# Patient Record
Sex: FEMALE | Race: WHITE | Employment: FULL TIME | URBAN - METROPOLITAN AREA
[De-identification: names, ages, dates, MRNs, and addresses within clinical notes are randomized per-mention and may not be internally consistent; named-entity substitution may affect disease eponyms.]

---

## 2017-03-27 LAB — DIABETIC RETINOPATHY: NEGATIVE

## 2019-03-26 ENCOUNTER — HOSPITAL ENCOUNTER (OUTPATIENT)
Age: 59
Discharge: HOME | End: 2019-03-28

## 2019-03-26 PROCEDURE — 88305 TISSUE EXAM BY PATHOLOGIST: CPT

## 2019-11-05 LAB
AVERAGE GLUCOSE: NORMAL
CHOLESTEROL, TOTAL: NORMAL
CHOLESTEROL/HDL RATIO: NORMAL
HBA1C MFR BLD: NORMAL %
HDLC SERPL-MCNC: NORMAL MG/DL
LDL CHOLESTEROL CALCULATED: NORMAL
TRIGL SERPL-MCNC: NORMAL MG/DL
VLDLC SERPL CALC-MCNC: NORMAL MG/DL

## 2020-05-21 ENCOUNTER — HOSPITAL ENCOUNTER (EMERGENCY)
Age: 60
Discharge: HOME | End: 2020-05-21
Attending: EMERGENCY MEDICINE
Payer: COMMERCIAL

## 2020-05-21 ENCOUNTER — APPOINTMENT (OUTPATIENT)
Dept: GENERAL RADIOLOGY | Age: 60
End: 2020-05-21
Payer: COMMERCIAL

## 2020-05-21 VITALS
OXYGEN SATURATION: 100 % | SYSTOLIC BLOOD PRESSURE: 152 MMHG | DIASTOLIC BLOOD PRESSURE: 67 MMHG | HEART RATE: 78 BPM | RESPIRATION RATE: 18 BRPM | TEMPERATURE: 98.2 F

## 2020-05-21 LAB
ALBUMIN SERPL-MCNC: 4.3 G/DL (ref 3.5–5.2)
ALP BLD-CCNC: 66 U/L (ref 35–104)
ALT SERPL-CCNC: 16 U/L (ref 0–32)
ANION GAP SERPL CALCULATED.3IONS-SCNC: 17 MMOL/L (ref 7–16)
AST SERPL-CCNC: 20 U/L (ref 0–31)
BASOPHILS ABSOLUTE: 0.07 E9/L (ref 0–0.2)
BASOPHILS RELATIVE PERCENT: 0.3 % (ref 0–2)
BETA-HYDROXYBUTYRATE: 0.61 MMOL/L (ref 0.02–0.27)
BILIRUB SERPL-MCNC: 1.5 MG/DL (ref 0–1.2)
BILIRUBIN URINE: NEGATIVE
BLOOD, URINE: NEGATIVE
BUN BLDV-MCNC: 20 MG/DL (ref 8–23)
CALCIUM SERPL-MCNC: 9.6 MG/DL (ref 8.6–10.2)
CHLORIDE BLD-SCNC: 99 MMOL/L (ref 98–107)
CHP ED QC CHECK: NORMAL
CLARITY: CLEAR
CO2: 22 MMOL/L (ref 22–29)
COLOR: ABNORMAL
CREAT SERPL-MCNC: 0.8 MG/DL (ref 0.5–1)
EOSINOPHILS ABSOLUTE: 0.01 E9/L (ref 0.05–0.5)
EOSINOPHILS RELATIVE PERCENT: 0 % (ref 0–6)
GFR AFRICAN AMERICAN: >60
GFR NON-AFRICAN AMERICAN: >60 ML/MIN/1.73
GLUCOSE BLD-MCNC: 234 MG/DL
GLUCOSE BLD-MCNC: 351 MG/DL (ref 74–99)
GLUCOSE URINE: >=1000 MG/DL
HCT VFR BLD CALC: 42.8 % (ref 34–48)
HEMOGLOBIN: 14.5 G/DL (ref 11.5–15.5)
IMMATURE GRANULOCYTES #: 0.14 E9/L
IMMATURE GRANULOCYTES %: 0.6 % (ref 0–5)
KETONES, URINE: 15 MG/DL
LEUKOCYTE ESTERASE, URINE: NEGATIVE
LYMPHOCYTES ABSOLUTE: 0.96 E9/L (ref 1.5–4)
LYMPHOCYTES RELATIVE PERCENT: 4.4 % (ref 20–42)
MCH RBC QN AUTO: 31.3 PG (ref 26–35)
MCHC RBC AUTO-ENTMCNC: 33.9 % (ref 32–34.5)
MCV RBC AUTO: 92.4 FL (ref 80–99.9)
METER GLUCOSE: 234 MG/DL (ref 74–99)
MONOCYTES ABSOLUTE: 1.45 E9/L (ref 0.1–0.95)
MONOCYTES RELATIVE PERCENT: 6.6 % (ref 2–12)
NEUTROPHILS ABSOLUTE: 19.36 E9/L (ref 1.8–7.3)
NEUTROPHILS RELATIVE PERCENT: 88.1 % (ref 43–80)
NITRITE, URINE: NEGATIVE
PDW BLD-RTO: 12.6 FL (ref 11.5–15)
PH UA: 6.5 (ref 5–9)
PLATELET # BLD: 175 E9/L (ref 130–450)
PMV BLD AUTO: 12.2 FL (ref 7–12)
POTASSIUM SERPL-SCNC: 4.3 MMOL/L (ref 3.5–5)
PROTEIN UA: NEGATIVE MG/DL
RBC # BLD: 4.63 E12/L (ref 3.5–5.5)
SODIUM BLD-SCNC: 138 MMOL/L (ref 132–146)
SPECIFIC GRAVITY UA: 1.01 (ref 1–1.03)
TOTAL PROTEIN: 7.4 G/DL (ref 6.4–8.3)
TROPONIN: <0.01 NG/ML (ref 0–0.03)
UROBILINOGEN, URINE: 0.2 E.U./DL
WBC # BLD: 22 E9/L (ref 4.5–11.5)

## 2020-05-21 PROCEDURE — 80053 COMPREHEN METABOLIC PANEL: CPT

## 2020-05-21 PROCEDURE — 82010 KETONE BODYS QUAN: CPT

## 2020-05-21 PROCEDURE — 93005 ELECTROCARDIOGRAM TRACING: CPT | Performed by: EMERGENCY MEDICINE

## 2020-05-21 PROCEDURE — 82962 GLUCOSE BLOOD TEST: CPT

## 2020-05-21 PROCEDURE — 81003 URINALYSIS AUTO W/O SCOPE: CPT

## 2020-05-21 PROCEDURE — 71045 X-RAY EXAM CHEST 1 VIEW: CPT

## 2020-05-21 PROCEDURE — 99285 EMERGENCY DEPT VISIT HI MDM: CPT

## 2020-05-21 PROCEDURE — 2580000003 HC RX 258: Performed by: EMERGENCY MEDICINE

## 2020-05-21 PROCEDURE — 85025 COMPLETE CBC W/AUTO DIFF WBC: CPT

## 2020-05-21 PROCEDURE — 84484 ASSAY OF TROPONIN QUANT: CPT

## 2020-05-21 RX ORDER — 0.9 % SODIUM CHLORIDE 0.9 %
1000 INTRAVENOUS SOLUTION INTRAVENOUS ONCE
Status: COMPLETED | OUTPATIENT
Start: 2020-05-21 | End: 2020-05-21

## 2020-05-21 RX ORDER — SODIUM CHLORIDE 9 MG/ML
INJECTION, SOLUTION INTRAVENOUS CONTINUOUS
Status: DISCONTINUED | OUTPATIENT
Start: 2020-05-21 | End: 2020-05-21

## 2020-05-21 RX ADMIN — SODIUM CHLORIDE 1000 ML: 9 INJECTION, SOLUTION INTRAVENOUS at 13:47

## 2020-05-21 NOTE — ED PROVIDER NOTES
Negative   Troponin   Result Value Ref Range    Troponin <0.01 0.00 - 0.03 ng/mL   POCT Glucose   Result Value Ref Range    Glucose 234 mg/dL    QC OK? y    ,       RADIOLOGY:  Interpreted by Radiologist unless otherwise specified  XR CHEST PORTABLE   Final Result   No acute process                     EKG Interpretation  Interpreted by emergency department physician, Dr. Gonzalez Granger, rate 62, no STEMI      ------------------------- NURSING NOTES AND VITALS REVIEWED ---------------------------   The nursing notes within the ED encounter and vital signs as below have been reviewed by myself  BP (!) 174/54   Pulse 74   Temp 98.2 °F (36.8 °C) (Tympanic)   Resp 20   SpO2 99%     Oxygen Saturation Interpretation: Normal    The patients available past medical records and past encounters were reviewed. ------------------------------ ED COURSE/MEDICAL DECISION MAKING----------------------  Medications   0.9 % sodium chloride infusion (has no administration in time range)   0.9 % sodium chloride bolus (0 mLs Intravenous Stopped 5/21/20 1437)           The cardiac monitor revealed sinus with a heart rate in the 70s as interpreted by me. The cardiac monitor was ordered secondary to the patient's hyperglycemia and to monitor the patient for dysrhythmia. CPT [id number]         Medical Decision Making:    Patient had no symptoms on arrival.  Did receive IV fluids. Labs and imaging reviewed. Reevaluation, patient's resting, once again no symptoms or complaints. She has no signs of DKA, repeat sugar is improving. She would like to go home. Therefore, patient will be discharged, she is to follow-up with her PCP in 1 to 2 days. She is educated on signs symptoms require emergent evaluation. Counseling: The emergency provider has spoken with the patient and discussed todays results, in addition to providing specific details for the plan of care and counseling regarding the diagnosis and prognosis.   Questions are answered at this time and they are agreeable with the plan.       --------------------------------- IMPRESSION AND DISPOSITION ---------------------------------    IMPRESSION  1. Hyperglycemia        DISPOSITION  Disposition: Discharge to home  Patient condition is stable        NOTE: This report was transcribed using voice recognition software.  Every effort was made to ensure accuracy; however, inadvertent computerized transcription errors may be present       Livan Prather MD  05/21/20 1576

## 2020-05-22 LAB
EKG ATRIAL RATE: 62 BPM
EKG P AXIS: 74 DEGREES
EKG P-R INTERVAL: 148 MS
EKG Q-T INTERVAL: 432 MS
EKG QRS DURATION: 84 MS
EKG QTC CALCULATION (BAZETT): 438 MS
EKG R AXIS: 74 DEGREES
EKG T AXIS: 74 DEGREES
EKG VENTRICULAR RATE: 62 BPM

## 2020-05-22 PROCEDURE — 93010 ELECTROCARDIOGRAM REPORT: CPT | Performed by: INTERNAL MEDICINE

## 2020-06-17 RX ORDER — ERGOCALCIFEROL 1.25 MG/1
50000 CAPSULE ORAL WEEKLY
Qty: 4 CAPSULE | Refills: 0 | Status: SHIPPED
Start: 2020-06-17 | End: 2020-08-03 | Stop reason: SDUPTHER

## 2020-07-01 VITALS
HEART RATE: 83 BPM | HEIGHT: 68 IN | TEMPERATURE: 98.5 F | BODY MASS INDEX: 25.16 KG/M2 | WEIGHT: 166 LBS | DIASTOLIC BLOOD PRESSURE: 75 MMHG | SYSTOLIC BLOOD PRESSURE: 137 MMHG

## 2020-07-01 RX ORDER — ATORVASTATIN CALCIUM 10 MG/1
10 TABLET, FILM COATED ORAL DAILY
COMMUNITY
End: 2020-10-12 | Stop reason: SDUPTHER

## 2020-07-15 ENCOUNTER — OFFICE VISIT (OUTPATIENT)
Dept: PRIMARY CARE CLINIC | Age: 60
End: 2020-07-15
Payer: COMMERCIAL

## 2020-07-15 VITALS
WEIGHT: 173 LBS | BODY MASS INDEX: 25.62 KG/M2 | HEART RATE: 88 BPM | TEMPERATURE: 97.2 F | DIASTOLIC BLOOD PRESSURE: 90 MMHG | SYSTOLIC BLOOD PRESSURE: 150 MMHG | RESPIRATION RATE: 18 BRPM | HEIGHT: 69 IN | OXYGEN SATURATION: 97 %

## 2020-07-15 PROCEDURE — 99214 OFFICE O/P EST MOD 30 MIN: CPT | Performed by: INTERNAL MEDICINE

## 2020-07-15 ASSESSMENT — PATIENT HEALTH QUESTIONNAIRE - PHQ9
2. FEELING DOWN, DEPRESSED OR HOPELESS: 0
SUM OF ALL RESPONSES TO PHQ QUESTIONS 1-9: 0
SUM OF ALL RESPONSES TO PHQ QUESTIONS 1-9: 0
SUM OF ALL RESPONSES TO PHQ9 QUESTIONS 1 & 2: 0
1. LITTLE INTEREST OR PLEASURE IN DOING THINGS: 0

## 2020-07-15 NOTE — PROGRESS NOTES
Michele Torres  7/15/20     Chief Complaint   Patient presents with    Diabetes     4 month check up        Allergies   Allergen Reactions    Pcn [Penicillins]     Sulfa Antibiotics         Current Outpatient Medications   Medication Sig Dispense Refill    atorvastatin (LIPITOR) 10 MG tablet Take 10 mg by mouth daily      vitamin D (ERGOCALCIFEROL) 1.25 MG (26680 UT) CAPS capsule Take 1 capsule by mouth once a week 4 capsule 0    levothyroxine (SYNTHROID) 25 MCG tablet TAKE ONE TABLET BY MOUTH EVERY DAY  90 tablet 1    COMBIPATCH 0.05-0.14 MG/DAY Place 1 patch onto the skin twice a week. 8 patch 2    lisinopril (PRINIVIL;ZESTRIL) 2.5 MG tablet Take 1 tablet by mouth daily. 90 tablet 0    insulin lispro (HUMALOG) 100 UNIT/ML injection Inject 3 Units into the skin continuous. Pt uses 140 units every 3 - 4 days in insulin pump  Indications: .8 units an hour pt adjusts as she eats 6 vial 1    glucose blood VI test strips (ASCENSIA AUTODISC VI;ONE TOUCH ULTRA TEST VI) strip by In Vitro route as needed. Pt tests 3 times daily      LANCETS by Does not apply route. Pt tests 3 times daily      INSULIN INFUSION PUMP by Does not apply route. HUMALOG INSULIN      Ascorbic Acid (VITAMIN C) 500 MG tablet Take 500 mg by mouth daily.  Cyanocobalamin (VITAMIN B-12 CR PO) Take 1 tablet by mouth daily.  VITAMIN E 400 mg by Does not apply route daily. No current facility-administered medications for this visit. HPI: Patient comes in for routine follow-up visit. She has been doing fairly well except for an episode of hyperglycemia and severe agitation which she attributes to taking co-Q10. Since she stopped taking that she has not had any further episodes. She has not had any recent episodes of hypoglycemia. She remains physically active but does not exercise on a regular basis. She denies any chest pain or shortness of breath.   She remains on all her usual meds and insulin the same as listed on her med list, which was reviewed with her. She follows up with her gynecologist for her annual Pap test and mammograms. She had a colonoscopy within the past year and apparently she had a small polyp removed. When she was in the ER about 2 months ago her white blood count was elevated 22,000. It was not clear what the cause of that was at the time. Review of Systems: as per HPI      Physical Exam:    Patient is a 63 y.o. female. Patient appears to be in no distress. Breathing comfortably. Ambulates without assistance. HEENT: normal.  Neck supple, no adenopathy or bruits. Heart RR, no MGR. Lungs clear. Abd: normal  Ext.: no edema. Peripheral pulses: normal.  No neurologic deficits noted. Lab Results   Component Value Date    WBC 22.0 (H) 05/21/2020    HGB 14.5 05/21/2020    HCT 42.8 05/21/2020     05/21/2020    CHOL 204 (H) 08/14/2015    TRIG 60 08/14/2015    HDL 83 08/14/2015    ALT 16 05/21/2020    AST 20 05/21/2020    TSH 3.530 08/14/2015    INR 1.2 12/28/2011    LABA1C 8.6 (H) 08/14/2015      Lab Results   Component Value Date     05/21/2020    K 4.3 05/21/2020    CL 99 05/21/2020    CO2 22 05/21/2020    BUN 20 05/21/2020    CREATININE 0.8 05/21/2020    GLUCOSE 234 05/21/2020    CALCIUM 9.6 05/21/2020    PROT 7.4 05/21/2020    LABALBU 4.3 05/21/2020    BILITOT 1.5 (H) 05/21/2020    ALKPHOS 66 05/21/2020    AST 20 05/21/2020    ALT 16 05/21/2020    LABGLOM >60 05/21/2020    GFRAA >60 05/21/2020            Assessment:      Type 1 diabetes mellitus without complication ([facility] [address]  -     Comprehensive Metabolic Panel; Future  -     Hemoglobin A1C; Future    Mixed hyperlipidemia  -     Lipid Panel; Future    Acquired hypothyroidism  -     Cancel: CBC; Future  -     TSH without Reflex; Future    Bandemia when checked in the ER 2 months ago when she had an episode of hyperglycemia. Significance uncertain.   -     CBC Auto Differential; Future          Discussion Notes: Patient to continue all of her usual meds and supplements the same as listed on her med list.  We will get blood work including a CMP, hemoglobin A1c, lipid panel, TSH, and CBC with differential.  Will make further recommendations depending on the results.   Otherwise she will return in 6 months for her annual physical.

## 2020-07-16 ENCOUNTER — TELEPHONE (OUTPATIENT)
Dept: PRIMARY CARE CLINIC | Age: 60
End: 2020-07-16

## 2020-07-16 NOTE — TELEPHONE ENCOUNTER
Pt was seen yesterday and went for labs this am but lab could not draw her due to the labs were ordered for expected date of 7/15/2021.  She is asking for those orders to be corrected with expected date of today so she can go back to lab tomorrow

## 2020-07-17 ENCOUNTER — HOSPITAL ENCOUNTER (OUTPATIENT)
Age: 60
Discharge: HOME | End: 2020-07-19
Payer: COMMERCIAL

## 2020-07-17 LAB
ALBUMIN SERPL-MCNC: 4.5 G/DL (ref 3.5–5.2)
ALP BLD-CCNC: 71 U/L (ref 35–104)
ALT SERPL-CCNC: 16 U/L (ref 0–32)
ANION GAP SERPL CALCULATED.3IONS-SCNC: 13 MMOL/L (ref 7–16)
AST SERPL-CCNC: 18 U/L (ref 0–31)
BILIRUB SERPL-MCNC: 0.9 MG/DL (ref 0–1.2)
BUN BLDV-MCNC: 18 MG/DL (ref 8–23)
CALCIUM SERPL-MCNC: 9.9 MG/DL (ref 8.6–10.2)
CHLORIDE BLD-SCNC: 101 MMOL/L (ref 98–107)
CHOLESTEROL, TOTAL: 177 MG/DL (ref 0–199)
CO2: 26 MMOL/L (ref 22–29)
CREAT SERPL-MCNC: 0.7 MG/DL (ref 0.5–1)
GFR AFRICAN AMERICAN: >60
GFR NON-AFRICAN AMERICAN: >60 ML/MIN/1.73
GLUCOSE BLD-MCNC: 145 MG/DL (ref 74–99)
HBA1C MFR BLD: 9 % (ref 4–5.6)
HCT VFR BLD CALC: 46.7 % (ref 34–48)
HDLC SERPL-MCNC: 86 MG/DL
HEMOGLOBIN: 15.6 G/DL (ref 11.5–15.5)
LDL CHOLESTEROL CALCULATED: 82 MG/DL (ref 0–99)
MCH RBC QN AUTO: 31.5 PG (ref 26–35)
MCHC RBC AUTO-ENTMCNC: 33.4 % (ref 32–34.5)
MCV RBC AUTO: 94.2 FL (ref 80–99.9)
PDW BLD-RTO: 12.8 FL (ref 11.5–15)
PLATELET # BLD: 186 E9/L (ref 130–450)
PMV BLD AUTO: 12.8 FL (ref 7–12)
POTASSIUM SERPL-SCNC: 4.8 MMOL/L (ref 3.5–5)
RBC # BLD: 4.96 E12/L (ref 3.5–5.5)
SODIUM BLD-SCNC: 140 MMOL/L (ref 132–146)
TOTAL PROTEIN: 7.3 G/DL (ref 6.4–8.3)
TRIGL SERPL-MCNC: 47 MG/DL (ref 0–149)
TSH SERPL DL<=0.05 MIU/L-ACNC: 2.45 UIU/ML (ref 0.27–4.2)
VLDLC SERPL CALC-MCNC: 9 MG/DL
WBC # BLD: 8.2 E9/L (ref 4.5–11.5)

## 2020-07-17 PROCEDURE — 85027 COMPLETE CBC AUTOMATED: CPT

## 2020-07-17 PROCEDURE — 36415 COLL VENOUS BLD VENIPUNCTURE: CPT

## 2020-07-17 PROCEDURE — 84443 ASSAY THYROID STIM HORMONE: CPT

## 2020-07-17 PROCEDURE — 80061 LIPID PANEL: CPT

## 2020-07-17 PROCEDURE — 83036 HEMOGLOBIN GLYCOSYLATED A1C: CPT

## 2020-07-17 PROCEDURE — 80053 COMPREHEN METABOLIC PANEL: CPT

## 2020-08-03 RX ORDER — ERGOCALCIFEROL 1.25 MG/1
50000 CAPSULE ORAL WEEKLY
Qty: 12 CAPSULE | Refills: 3 | Status: SHIPPED
Start: 2020-08-03 | End: 2021-07-06 | Stop reason: SDUPTHER

## 2020-10-12 RX ORDER — ATORVASTATIN CALCIUM 10 MG/1
10 TABLET, FILM COATED ORAL DAILY
Qty: 12 TABLET | Refills: 3 | Status: SHIPPED
Start: 2020-10-12 | End: 2021-02-02 | Stop reason: SDUPTHER

## 2020-11-12 RX ORDER — LISINOPRIL 20 MG/1
20 TABLET ORAL DAILY
COMMUNITY
End: 2020-11-12 | Stop reason: SDUPTHER

## 2020-11-12 RX ORDER — LISINOPRIL 20 MG/1
20 TABLET ORAL DAILY
Qty: 90 TABLET | Refills: 3 | Status: SHIPPED
Start: 2020-11-12 | End: 2021-12-13 | Stop reason: SDUPTHER

## 2021-02-02 RX ORDER — ATORVASTATIN CALCIUM 10 MG/1
TABLET, FILM COATED ORAL
Qty: 12 TABLET | Refills: 3 | Status: SHIPPED
Start: 2021-02-02 | End: 2021-06-08 | Stop reason: SDUPTHER

## 2021-02-15 ENCOUNTER — OFFICE VISIT (OUTPATIENT)
Dept: PRIMARY CARE CLINIC | Age: 61
End: 2021-02-15
Payer: COMMERCIAL

## 2021-02-15 VITALS
RESPIRATION RATE: 16 BRPM | WEIGHT: 171 LBS | HEART RATE: 91 BPM | OXYGEN SATURATION: 99 % | TEMPERATURE: 97 F | HEIGHT: 69 IN | SYSTOLIC BLOOD PRESSURE: 150 MMHG | BODY MASS INDEX: 25.33 KG/M2 | DIASTOLIC BLOOD PRESSURE: 82 MMHG

## 2021-02-15 DIAGNOSIS — I10 ESSENTIAL HYPERTENSION: Primary | ICD-10-CM

## 2021-02-15 DIAGNOSIS — E10.9 TYPE 1 DIABETES MELLITUS WITHOUT COMPLICATION (HCC): Chronic | ICD-10-CM

## 2021-02-15 PROCEDURE — 99213 OFFICE O/P EST LOW 20 MIN: CPT | Performed by: INTERNAL MEDICINE

## 2021-02-15 RX ORDER — AMLODIPINE BESYLATE 2.5 MG/1
2.5 TABLET ORAL DAILY
Qty: 30 TABLET | Refills: 1 | Status: SHIPPED
Start: 2021-02-15 | End: 2021-04-12 | Stop reason: SDUPTHER

## 2021-02-15 ASSESSMENT — PATIENT HEALTH QUESTIONNAIRE - PHQ9
SUM OF ALL RESPONSES TO PHQ QUESTIONS 1-9: 0
1. LITTLE INTEREST OR PLEASURE IN DOING THINGS: 0
SUM OF ALL RESPONSES TO PHQ QUESTIONS 1-9: 0

## 2021-02-15 NOTE — PROGRESS NOTES
Michele Torres  2/15/21     Chief Complaint   Patient presents with    Hypertension        Allergies   Allergen Reactions    Pcn [Penicillins]     Sulfa Antibiotics         Current Outpatient Medications   Medication Sig Dispense Refill    atorvastatin (LIPITOR) 10 MG tablet Take one tab Monday,wenesday,friday 12 tablet 3    blood glucose test strips (ASCENSIA AUTODISC VI;ONE TOUCH ULTRA TEST VI) strip Contour next test strips check qid 400 each 3    insulin lispro (HUMALOG) 100 UNIT/ML injection vial Inject 3 Units into the skin continuous Indications: .8 units an hour pt adjusts as she eats Pt uses 140 units every 3 - 4 days in insulin pump 6 vial 1    lisinopril (PRINIVIL;ZESTRIL) 20 MG tablet Take 1 tablet by mouth daily 90 tablet 3    vitamin D (ERGOCALCIFEROL) 1.25 MG (76701 UT) CAPS capsule Take 1 capsule by mouth once a week 12 capsule 3    levothyroxine (SYNTHROID) 25 MCG tablet TAKE ONE TABLET BY MOUTH EVERY DAY  90 tablet 1    COMBIPATCH 0.05-0.14 MG/DAY Place 1 patch onto the skin twice a week. 8 patch 2    LANCETS by Does not apply route. Pt tests 3 times daily      INSULIN INFUSION PUMP by Does not apply route. HUMALOG INSULIN      Ascorbic Acid (VITAMIN C) 500 MG tablet Take 500 mg by mouth daily.  Cyanocobalamin (VITAMIN B-12 CR PO) Take 1 tablet by mouth daily.  VITAMIN E 400 mg by Does not apply route daily.  amLODIPine (NORVASC) 2.5 MG tablet Take 1 tablet by mouth daily 30 tablet 1     No current facility-administered medications for this visit. HPI: Patient comes in today stating that her blood pressures been up lately. Also she complains of frequent headaches for which he takes Excedrin Migraine or ibuprofen. She denies any chest pain or shortness of breath. She has not had any episodes of hypoglycemia. She is scheduled to get her second COVID-19 vaccine later this week. She monitors her blood sugars closely at home and remains on all her usual meds and insulin the same. Review of Systems: as per HPI      Physical Exam:    Patient is a 63 y.o. female. Patient appears to be in no distress. Breathing comfortably. Ambulates without assistance. HEENT: normal.  Neck supple, no adenopathy or bruits. Heart RR, no MGR. Lungs clear. Abd: normal  Ext: no edema. Peripheral pulses: normal.  No neurologic deficits noted. Lab Results   Component Value Date    WBC 8.2 07/17/2020    HGB 15.6 (H) 07/17/2020    HCT 46.7 07/17/2020     07/17/2020    CHOL 177 07/17/2020    TRIG 47 07/17/2020    HDL 86 07/17/2020    ALT 16 07/17/2020    AST 18 07/17/2020    TSH 2.450 07/17/2020    INR 1.2 12/28/2011    LABA1C 9.0 (H) 07/17/2020      Lab Results   Component Value Date     07/17/2020    K 4.8 07/17/2020     07/17/2020    CO2 26 07/17/2020    BUN 18 07/17/2020    CREATININE 0.7 07/17/2020    GLUCOSE 145 (H) 07/17/2020    CALCIUM 9.9 07/17/2020    PROT 7.3 07/17/2020    LABALBU 4.5 07/17/2020    BILITOT 0.9 07/17/2020    ALKPHOS 71 07/17/2020    AST 18 07/17/2020    ALT 16 07/17/2020    LABGLOM >60 07/17/2020    GFRAA >60 07/17/2020            Assessment:    Santhosh Luna was seen today for hypertension. Diagnoses and all orders for this visit:    Essential hypertension, not well controlled. Type 1 diabetes mellitus without complication ([address] fair control based on blood sugar readings at home.

## 2021-04-12 DIAGNOSIS — I10 ESSENTIAL HYPERTENSION: ICD-10-CM

## 2021-04-12 RX ORDER — AMLODIPINE BESYLATE 2.5 MG/1
2.5 TABLET ORAL DAILY
Qty: 90 TABLET | Refills: 3 | Status: SHIPPED
Start: 2021-04-12 | End: 2021-05-24

## 2021-05-06 DIAGNOSIS — E03.9 ACQUIRED HYPOTHYROIDISM: Primary | ICD-10-CM

## 2021-05-06 RX ORDER — LEVOTHYROXINE SODIUM 0.03 MG/1
25 TABLET ORAL DAILY
Qty: 90 TABLET | Refills: 3 | Status: SHIPPED
Start: 2021-05-06 | End: 2022-05-16 | Stop reason: SDUPTHER

## 2021-05-14 ENCOUNTER — TELEPHONE (OUTPATIENT)
Dept: PRIMARY CARE CLINIC | Age: 61
End: 2021-05-14

## 2021-05-14 DIAGNOSIS — E10.9 TYPE 1 DIABETES MELLITUS WITHOUT COMPLICATION (HCC): Primary | Chronic | ICD-10-CM

## 2021-05-14 DIAGNOSIS — I10 ESSENTIAL HYPERTENSION: ICD-10-CM

## 2021-05-22 ENCOUNTER — HOSPITAL ENCOUNTER (OUTPATIENT)
Age: 61
Discharge: HOME | End: 2021-05-22
Payer: COMMERCIAL

## 2021-05-22 DIAGNOSIS — E78.2 MIXED HYPERLIPIDEMIA: ICD-10-CM

## 2021-05-22 DIAGNOSIS — I10 ESSENTIAL HYPERTENSION: ICD-10-CM

## 2021-05-22 DIAGNOSIS — E03.9 ACQUIRED HYPOTHYROIDISM: ICD-10-CM

## 2021-05-22 DIAGNOSIS — E10.9 TYPE 1 DIABETES MELLITUS WITHOUT COMPLICATION (HCC): Chronic | ICD-10-CM

## 2021-05-22 LAB
ALBUMIN SERPL-MCNC: 4.4 G/DL (ref 3.5–5.2)
ALP BLD-CCNC: 83 U/L (ref 35–104)
ALT SERPL-CCNC: 18 U/L (ref 0–32)
ANION GAP SERPL CALCULATED.3IONS-SCNC: 12 MMOL/L (ref 7–16)
AST SERPL-CCNC: 17 U/L (ref 0–31)
BASOPHILS ABSOLUTE: 0.1 E9/L (ref 0–0.2)
BASOPHILS RELATIVE PERCENT: 0.9 % (ref 0–2)
BILIRUB SERPL-MCNC: 1 MG/DL (ref 0–1.2)
BUN BLDV-MCNC: 12 MG/DL (ref 6–23)
CALCIUM SERPL-MCNC: 9.5 MG/DL (ref 8.6–10.2)
CHLORIDE BLD-SCNC: 100 MMOL/L (ref 98–107)
CHOLESTEROL, TOTAL: 173 MG/DL (ref 0–199)
CO2: 24 MMOL/L (ref 22–29)
CREAT SERPL-MCNC: 0.9 MG/DL (ref 0.5–1)
EOSINOPHILS ABSOLUTE: 0.16 E9/L (ref 0.05–0.5)
EOSINOPHILS RELATIVE PERCENT: 1.4 % (ref 0–6)
GFR AFRICAN AMERICAN: >60
GFR NON-AFRICAN AMERICAN: >60 ML/MIN/1.73
GLUCOSE BLD-MCNC: 252 MG/DL (ref 74–99)
HBA1C MFR BLD: 8.8 % (ref 4–5.6)
HCT VFR BLD CALC: 43 % (ref 34–48)
HDLC SERPL-MCNC: 88 MG/DL
HEMOGLOBIN: 14.5 G/DL (ref 11.5–15.5)
IMMATURE GRANULOCYTES #: 0.04 E9/L
IMMATURE GRANULOCYTES %: 0.3 % (ref 0–5)
LDL CHOLESTEROL CALCULATED: 66 MG/DL (ref 0–99)
LYMPHOCYTES ABSOLUTE: 2.15 E9/L (ref 1.5–4)
LYMPHOCYTES RELATIVE PERCENT: 18.5 % (ref 20–42)
MCH RBC QN AUTO: 30.7 PG (ref 26–35)
MCHC RBC AUTO-ENTMCNC: 33.7 % (ref 32–34.5)
MCV RBC AUTO: 91.1 FL (ref 80–99.9)
MONOCYTES ABSOLUTE: 0.82 E9/L (ref 0.1–0.95)
MONOCYTES RELATIVE PERCENT: 7.1 % (ref 2–12)
NEUTROPHILS ABSOLUTE: 8.34 E9/L (ref 1.8–7.3)
NEUTROPHILS RELATIVE PERCENT: 71.8 % (ref 43–80)
PDW BLD-RTO: 12.7 FL (ref 11.5–15)
PLATELET # BLD: 201 E9/L (ref 130–450)
PMV BLD AUTO: 12.9 FL (ref 7–12)
POTASSIUM SERPL-SCNC: 4.4 MMOL/L (ref 3.5–5)
RBC # BLD: 4.72 E12/L (ref 3.5–5.5)
SODIUM BLD-SCNC: 136 MMOL/L (ref 132–146)
TOTAL PROTEIN: 7.2 G/DL (ref 6.4–8.3)
TRIGL SERPL-MCNC: 95 MG/DL (ref 0–149)
TSH SERPL DL<=0.05 MIU/L-ACNC: 2.32 UIU/ML (ref 0.27–4.2)
VLDLC SERPL CALC-MCNC: 19 MG/DL
WBC # BLD: 11.6 E9/L (ref 4.5–11.5)

## 2021-05-22 PROCEDURE — 36415 COLL VENOUS BLD VENIPUNCTURE: CPT

## 2021-05-22 PROCEDURE — 80061 LIPID PANEL: CPT

## 2021-05-22 PROCEDURE — 80053 COMPREHEN METABOLIC PANEL: CPT

## 2021-05-22 PROCEDURE — 85025 COMPLETE CBC W/AUTO DIFF WBC: CPT

## 2021-05-22 PROCEDURE — 83036 HEMOGLOBIN GLYCOSYLATED A1C: CPT

## 2021-05-22 PROCEDURE — 84443 ASSAY THYROID STIM HORMONE: CPT

## 2021-05-24 ENCOUNTER — OFFICE VISIT (OUTPATIENT)
Dept: PRIMARY CARE CLINIC | Age: 61
End: 2021-05-24
Payer: COMMERCIAL

## 2021-05-24 VITALS
WEIGHT: 173 LBS | DIASTOLIC BLOOD PRESSURE: 80 MMHG | RESPIRATION RATE: 18 BRPM | TEMPERATURE: 97.6 F | SYSTOLIC BLOOD PRESSURE: 138 MMHG | HEART RATE: 74 BPM | HEIGHT: 69 IN | BODY MASS INDEX: 25.62 KG/M2 | OXYGEN SATURATION: 100 %

## 2021-05-24 DIAGNOSIS — E78.2 MIXED HYPERLIPIDEMIA: ICD-10-CM

## 2021-05-24 DIAGNOSIS — E10.9 TYPE 1 DIABETES MELLITUS WITHOUT COMPLICATION (HCC): Primary | ICD-10-CM

## 2021-05-24 DIAGNOSIS — I10 ESSENTIAL HYPERTENSION: ICD-10-CM

## 2021-05-24 DIAGNOSIS — E03.9 ACQUIRED HYPOTHYROIDISM: ICD-10-CM

## 2021-05-24 LAB
BILIRUBIN, POC: NORMAL
BLOOD URINE, POC: NORMAL
CLARITY, POC: CLEAR
COLOR, POC: YELLOW
GLUCOSE URINE, POC: NORMAL
KETONES, POC: NORMAL
LEUKOCYTE EST, POC: NORMAL
NITRITE, POC: NORMAL
PH, POC: 6
PROTEIN, POC: NORMAL
SPECIFIC GRAVITY, POC: 1.01
UROBILINOGEN, POC: 0.2

## 2021-05-24 PROCEDURE — 93000 ELECTROCARDIOGRAM COMPLETE: CPT | Performed by: INTERNAL MEDICINE

## 2021-05-24 PROCEDURE — 99396 PREV VISIT EST AGE 40-64: CPT | Performed by: INTERNAL MEDICINE

## 2021-05-24 PROCEDURE — 81002 URINALYSIS NONAUTO W/O SCOPE: CPT | Performed by: INTERNAL MEDICINE

## 2021-05-24 RX ORDER — AMLODIPINE BESYLATE 5 MG/1
5 TABLET ORAL DAILY
Qty: 90 TABLET | Refills: 3 | Status: SHIPPED
Start: 2021-05-24 | End: 2022-05-31 | Stop reason: SDUPTHER

## 2021-05-24 SDOH — ECONOMIC STABILITY: FOOD INSECURITY: WITHIN THE PAST 12 MONTHS, YOU WORRIED THAT YOUR FOOD WOULD RUN OUT BEFORE YOU GOT MONEY TO BUY MORE.: NEVER TRUE

## 2021-05-24 ASSESSMENT — SOCIAL DETERMINANTS OF HEALTH (SDOH): HOW HARD IS IT FOR YOU TO PAY FOR THE VERY BASICS LIKE FOOD, HOUSING, MEDICAL CARE, AND HEATING?: NOT HARD AT ALL

## 2021-05-24 NOTE — PROGRESS NOTES
Michele Torres  5/24/21     Chief Complaint   Patient presents with    Hypertension     physical        Allergies   Allergen Reactions    Pcn [Penicillins]     Sulfa Antibiotics         Current Outpatient Medications   Medication Sig Dispense Refill    amLODIPine (NORVASC) 5 MG tablet Take 1 tablet by mouth daily 90 tablet 3    levothyroxine (SYNTHROID) 25 MCG tablet Take 1 tablet by mouth Daily 90 tablet 3    atorvastatin (LIPITOR) 10 MG tablet Take one tab Monday,wenesday,friday 12 tablet 3    blood glucose test strips (ASCENSIA AUTODISC VI;ONE TOUCH ULTRA TEST VI) strip Contour next test strips check qid 400 each 3    insulin lispro (HUMALOG) 100 UNIT/ML injection vial Inject 3 Units into the skin continuous Indications: .8 units an hour pt adjusts as she eats Pt uses 140 units every 3 - 4 days in insulin pump 6 vial 1    lisinopril (PRINIVIL;ZESTRIL) 20 MG tablet Take 1 tablet by mouth daily 90 tablet 3    vitamin D (ERGOCALCIFEROL) 1.25 MG (26607 UT) CAPS capsule Take 1 capsule by mouth once a week 12 capsule 3    COMBIPATCH 0.05-0.14 MG/DAY Place 1 patch onto the skin twice a week. 8 patch 2    LANCETS by Does not apply route. Pt tests 3 times daily      INSULIN INFUSION PUMP by Does not apply route. HUMALOG INSULIN      Ascorbic Acid (VITAMIN C) 500 MG tablet Take 500 mg by mouth daily.  Cyanocobalamin (VITAMIN B-12 CR PO) Take 1 tablet by mouth daily.  VITAMIN E 400 mg by Does not apply route daily. No current facility-administered medications for this visit. HPI: Patient comes in for her annual physical.  She is now 57years of age. Currently she feels fairly well. She remains on treatment of her type 1 diabetes mellitus Humalog insulin pump. She monitors her blood sugars closely and still has occasional episode of symptomatic hypoglycemia. She remains on her usual meds and supplements the same as listed on her med list, which was reviewed with her.   She denies any chest pain or shortness of breath. She monitors her blood pressure closely and it has been somewhat elevated over the past few months. Last visit she was started on a small dose of amlodipine 2.5 mg daily I did help bring her blood pressure down somewhat. She does follow-up with her eye doctor for her annual diabetic eye exams. Review of Systems    General:   no weight change, no malaise, no fatigue, no change in appetite, no sleep disturbance, no fever/chills, no night sweats,  Skin:                no abnormal pigmentation, no rash, no scaling, no itching, no masses, no hair or nail changes  Eyes:               no blurring, no diplopia, no eye pain, no glaucoma, no cataracts  ENT:                 no hearing loss, no tinnitus, no vertigo, no nosebleed, no nasal congestion, no rhinorrhea, no sore throat, no jaw pain, no hoarseness,  no bleeding    Neck:     no node tenderness , not rigid, no masses   Respiratory:           no cough, no sputum, no coughing blood, no pleuritic , no chest pain, no dyspnea,  no wheezing  Cardiovascular:         no angina, no chest pain  No syncope, no pedal edema , no orthopnea, no PND, no palpitations, no claudication  Gastrointestinal  no nausea, no vomiting, no heartburn, no diarrhea, no constipation, no bloating, no abdominal pain, no rectal pain, no bleeding no hemorrhoids, no hernia.              Genitourinary:            no urinary urgency, no frequency, no dysuria, no nocturia, no hesitancy, no  incontinence, no bleeding, no stones  Musculoskeletal:        no arthritis, no  arthralgia, no myalgia, no  weakness,  no morning stiffness, no joint swelling   Neurologic:                 no paralysis, no paresis, no  paresthesia, no seizures, no tremors, no headaches, no tumors , no stroke, no speech issues,  No incoordination, no head trauma, no memory loss/concentration  Hematologic:              no anemia, no abnormal bleeding / bruising, no fever, no chills, no night sweats, no wollen glands, no unexplained weight loss  Endocrine:        no heat or cold intolerance and no polyphagia, polydipsia,  or polyuria  Psych:  No anxiety or depression. Physical Exam:  Patient is an 57 y.o. female     Constitutional:  General Appearance: Well-appearing. Level of Distress: NAD. Ambulation: ambulating normally    Psychiatric:  Insight: good judgment: Mental status: normal mood and affect. Orientation: oriented to time place and person. Memory: recent memory normal and remote memory normal.     Head: normocephalic and atraumatic. Eyes:  Lids and Conjunctiva: Non-injected and no pallor. Pupils: PERRLA, Corneas: grossly intact. EOMI, Lens: clear. Sclerae: non-icteric. Vision: peripheral vision grossly intact and acuity grossly intact. ENMT:  Ears: no lesions on external ear. TMs clear and TM mobility normal.  Hearing: no hearing loss. Nose: No lesions on external nose, septal deviation sinus tenderness or nasal discharge and nares patent and nasal passages clear. Lips, Teeth and Gums:  no mouth or lip ulcers or bleeding gums and normal dentition. Oropharynx: no erythema or exudates and moist mucous membranes and tonsils not enlarged. Neck:  Neck supple, FROM, trachea midline, and no masses. Lymph nodes: no cervical LAD, supraclavicular LAD, axillary LAD, or inguinal LAD. Thyroid: non-tender and no enlargement. Lungs:  Respiratory effort, no dyspnea. Auscultation: no rales/crackles or rhonchi and breath sounds normal, good air movement and CTA except as noted. Cardiovascular: Apical impulse:not displaced. Heart: Auscultation: normal S1 and S2, no murmurs, rubs or gallops; and RRR. Neck vessels: no carotid bruits. Pulses including femoral/pedal: normal throughout. Abdomen: Bowel sounds: normal.  Inspection and Palpation: no tenderness, guarding, masses, rebound tenderness or CVA tenderness and soft and non-distended.   Liver: non-tender and no hepatomegaly. Spleen: non-tender and no splenomegaly. Hernia: none palpable. Musculoskeletal: Motor Strength and Tone: normal tone and motor strength. Joints, Bones and Muscles: no malalignment, tenderness or bony abnormalities and normal movement of all extremities. Extremities: no cyanosis, edema, varicosities, or palpable cord. Neurologic:  Gait and Station: normal gait and station. Cranial nerves:grossly intact. Sensation:grossly intact and monofilament test intact. Reflexes: DTRs 2+ bilaterally throughout. Coordination and Cerebellum: finger to nose intact and no tremor. Skin: Inspection and palpation: no rash, lesions, ulcer, induration, nodules, jaundice or abnormal nevi and good turgor. Nails: normal.     Back:  Thoracolumbar Appearance: normal curvature. I have examined the patient's feet and found no evidence of deformities, calluses, ulcerations, or evidence of neuropathy. Lab Results   Component Value Date    WBC 11.6 (H) 05/22/2021    HGB 14.5 05/22/2021    HCT 43.0 05/22/2021     05/22/2021    CHOL 173 05/22/2021    TRIG 95 05/22/2021    HDL 88 05/22/2021    ALT 18 05/22/2021    AST 17 05/22/2021    TSH 2.320 05/22/2021    INR 1.2 12/28/2011    LABA1C 8.8 (H) 05/22/2021      Lab Results   Component Value Date     05/22/2021    K 4.4 05/22/2021     05/22/2021    CO2 24 05/22/2021    BUN 12 05/22/2021    CREATININE 0.9 05/22/2021    GLUCOSE 252 (H) 05/22/2021    CALCIUM 9.5 05/22/2021    PROT 7.2 05/22/2021    LABALBU 4.4 05/22/2021    BILITOT 1.0 05/22/2021    ALKPHOS 83 05/22/2021    AST 17 05/22/2021    ALT 18 05/22/2021    LABGLOM >60 05/22/2021    GFRAA >60 05/22/2021            Assessment:    Type 1 diabetes mellitus without complication ([address] not optimally controlled in view of her hemoglobin A1c of 8.8%. -     Kole Malik MD, Endocrinology, [place]  -     Comprehensive Metabolic Panel;  Future  -     Hemoglobin A1C; Future  -

## 2021-06-08 RX ORDER — ATORVASTATIN CALCIUM 10 MG/1
TABLET, FILM COATED ORAL
Qty: 12 TABLET | Refills: 3 | Status: SHIPPED
Start: 2021-06-08 | End: 2021-06-14 | Stop reason: SDUPTHER

## 2021-06-14 RX ORDER — ATORVASTATIN CALCIUM 10 MG/1
TABLET, FILM COATED ORAL
Qty: 12 TABLET | Refills: 3 | Status: SHIPPED
Start: 2021-06-14 | End: 2022-01-25 | Stop reason: SDUPTHER

## 2021-06-24 ENCOUNTER — NURSE ONLY (OUTPATIENT)
Dept: PRIMARY CARE CLINIC | Age: 61
End: 2021-06-24
Payer: COMMERCIAL

## 2021-06-24 DIAGNOSIS — R31.9 HEMATURIA, UNSPECIFIED TYPE: Primary | ICD-10-CM

## 2021-06-24 LAB
BILIRUBIN, POC: NORMAL
BLOOD URINE, POC: NORMAL
CLARITY, POC: CLEAR
COLOR, POC: YELLOW
GLUCOSE URINE, POC: 1000
KETONES, POC: NORMAL
LEUKOCYTE EST, POC: NORMAL
NITRITE, POC: NORMAL
PH, POC: 5.5
PROTEIN, POC: NORMAL
SPECIFIC GRAVITY, POC: >=1.03
UROBILINOGEN, POC: 0.2

## 2021-06-24 PROCEDURE — 81002 URINALYSIS NONAUTO W/O SCOPE: CPT | Performed by: INTERNAL MEDICINE

## 2021-06-29 ENCOUNTER — OFFICE VISIT (OUTPATIENT)
Dept: ENDOCRINOLOGY | Age: 61
End: 2021-06-29
Payer: COMMERCIAL

## 2021-06-29 VITALS
DIASTOLIC BLOOD PRESSURE: 78 MMHG | SYSTOLIC BLOOD PRESSURE: 138 MMHG | WEIGHT: 171 LBS | OXYGEN SATURATION: 97 % | HEART RATE: 86 BPM | HEIGHT: 69 IN | RESPIRATION RATE: 18 BRPM | BODY MASS INDEX: 25.33 KG/M2

## 2021-06-29 DIAGNOSIS — E55.9 VITAMIN D DEFICIENCY: ICD-10-CM

## 2021-06-29 DIAGNOSIS — E03.9 HYPOTHYROIDISM, UNSPECIFIED TYPE: ICD-10-CM

## 2021-06-29 DIAGNOSIS — E10.65 TYPE 1 DIABETES MELLITUS WITH HYPERGLYCEMIA (HCC): Primary | ICD-10-CM

## 2021-06-29 PROCEDURE — 3052F HG A1C>EQUAL 8.0%<EQUAL 9.0%: CPT | Performed by: INTERNAL MEDICINE

## 2021-06-29 PROCEDURE — 99204 OFFICE O/P NEW MOD 45 MIN: CPT | Performed by: INTERNAL MEDICINE

## 2021-06-29 NOTE — PROGRESS NOTES
[address] [facility] Department of Endocrinology Diabetes and Metabolism   [address] [facility] [address]   Phone: [phone]  Fax: [phone]    Date of Service: 6/29/2021  Primary Care Physician: Layne Angeles MD  Referring physician: Ayse Vera MD  Provider: Zuri Headley MD     Reason for the visit:  DM type 1     History of Present Illness: The history is provided by the patient. No  was used. Accuracy of the patient data is excellent. Angela Schaffer is a very pleasant 57 y.o. female seen today for diabetes management     Angela Schaffer was diagnosed with diabetes at age 27  and currently on 12g Medtronic insulin pump  cuerent settings basal rate 12a 0.825, 8:30a 1.2, 12p 1.1, 6p 0.850, CR 7, ISF 39, active insulin time 4 hrs  The patient has been checking blood sugar 4 timesa day and readinsg are variable   Most recent A1c results summarized below  Lab Results   Component Value Date    LABA1C 8.8 05/22/2021    LABA1C 9.0 07/17/2020    LABA1C 8.6 08/14/2015     Patient reported hypoglycemic episodes  The patient has been mindful of what has been eating and following diabetic diet as encouraged  I reviewed current medications and the patient has no issues with diabetes medications  Angela Schaffer is up to date with eye exam and denied any history of diabetic retinopathy   The patient performs her own feet care  Microvascular complications:  No Retinopathy, Nephropathy or Neuropathy   Macrovascular complications: no CAD, PVD, or Stroke  The patient receives Flushot every year    On Levothyroxine 25 mcg daily. Patient takes levothyroxine in the morning at empty stomach, wait one hour before eating , avoid multivitamins containing calcium  or iron with it.       PAST MEDICAL HISTORY   Past Medical History:   Diagnosis Date    Diabetes mellitus ([address]     INSULIN PUMP    Headache(784.0)     Hyperlipidemia     Hypertension     Hypothyroidism     Osteoarthritis     wrists    Thyroid disease     Type 2 diabetes mellitus without complication (HCC)     Type II or unspecified type diabetes mellitus without mention of complication, not stated as uncontrolled     unsure of type    Vitamin D deficiency        PAST SURGICAL HISTORY   Past Surgical History:   Procedure Laterality Date    COLONOSCOPY  03/26/2019    EYE SURGERY      LASIX    NECK SURGERY      TUBAL LIGATION      WRIST FRACTURE SURGERY      I&D right distal radius, with ex-fix       SOCIAL HISTORY   Tobacco:   reports that she quit smoking about 9 years ago. Her smoking use included cigarettes. She has a 5.00 pack-year smoking history. She has never used smokeless tobacco.  Alcohol:   reports current alcohol use of about 1.7 standard drinks of alcohol per week. Drugs:   reports no history of drug use.     FAMILY HISTORY   Family History   Problem Relation Age of Onset    Dementia Mother     Heart Disease Father     Liver Cancer Sister        ALLERGIES AND DRUG REACTIONS   Allergies   Allergen Reactions    Pcn [Penicillins]     Sulfa Antibiotics        CURRENT MEDICATIONS   Current Outpatient Medications   Medication Sig Dispense Refill    Calcium Carbonate (CALCIUM 500 PO) Take by mouth      atorvastatin (LIPITOR) 10 MG tablet Take one tab Monday,wenesday,friday 12 tablet 3    amLODIPine (NORVASC) 5 MG tablet Take 1 tablet by mouth daily 90 tablet 3    levothyroxine (SYNTHROID) 25 MCG tablet Take 1 tablet by mouth Daily 90 tablet 3    blood glucose test strips (ASCENSIA AUTODISC VI;ONE TOUCH ULTRA TEST VI) strip Contour next test strips check qid 400 each 3    insulin lispro (HUMALOG) 100 UNIT/ML injection vial Inject 3 Units into the skin continuous Indications: .8 units an hour pt adjusts as she eats Pt uses 140 units every 3 - 4 days in insulin pump 6 vial 1    lisinopril (PRINIVIL;ZESTRIL) 20 MG tablet Take 1 tablet by mouth daily 90 tablet 3    vitamin D (ERGOCALCIFEROL) 1.25 MG (96677 UT) CAPS capsule Take 1 capsule by mouth once a week 12 capsule 3    COMBIPATCH 0.05-0.14 MG/DAY Place 1 patch onto the skin twice a week. 8 patch 2    LANCETS by Does not apply route. Pt tests 3 times daily      INSULIN INFUSION PUMP by Does not apply route. HUMALOG INSULIN      Ascorbic Acid (VITAMIN C) 500 MG tablet Take 500 mg by mouth daily.  Cyanocobalamin (VITAMIN B-12 CR PO) Take 1 tablet by mouth daily.  VITAMIN E 400 mg by Does not apply route daily. No current facility-administered medications for this visit. Review of Systems  Constitutional: No fever, no chills, no diaphoresis, no generalized weakness. HEENT: No blurred vision, No sore throat, no ear pain, no hair loss  Neck: denied any neck swelling, difficulty swallowing,   Cardio-pulmonary: No CP, SOB or palpitation, No orthopnea or PND. No cough or wheezing. GI: No N/V/D, no constipation, No abdominal pain, no melena or hematochezia   : Denied any dysuria, hematuria, flank pain, discharge, or incontinence. Skin: denied any rash, ulcer, Hirsute, or hyperpigmentation. MSK: denied any joint deformity, joint pain/swelling, muscle pain, or back pain. Neuro: no numbness, no tingling, no weakness, _    OBJECTIVE    /78   Pulse 86   Resp 18   Ht 5' 9\" (1.753 m)   Wt 171 lb (77.6 kg)   SpO2 97%   BMI 25.25 kg/m²   BP Readings from Last 4 Encounters:   06/29/21 138/78   05/24/21 138/80   02/15/21 (!) 150/82   07/15/20 (!) 150/90     Wt Readings from Last 6 Encounters:   06/29/21 171 lb (77.6 kg)   05/24/21 173 lb (78.5 kg)   02/15/21 171 lb (77.6 kg)   07/15/20 173 lb (78.5 kg)   11/19/19 166 lb (75.3 kg)   06/10/14 172 lb (78 kg)       Physical examination:  General: awake alert, oriented x3, no abnormal position or movements. HEENT: normocephalic non-traumatic, no exophthalmos   Neck: supple, no LN enlargement, no thyromegaly, no thyroid tenderness, no JVD.   Pulm: Clear equal air entry no added sounds, no wheezing or rhonchi    CVS: S1 + S2, no murmur, no heave. Dorsalis pedis pulse palpable   Abd: soft lax, no tenderness, no organomegaly, audible bowel sounds. Skin: warm, no lesions, no rash.  No callus, no Ulcers, No acanthosis nigricans  Musculoskeletal: No back tenderness, no kyphosis/scoliosis    Neuro: CN intact, sensation present bilateral , muscle power normal  Psych: normal mood, and affect      Review of Laboratory Data:  I personally reviewed the following lab:  Lab Results   Component Value Date/Time    WBC 11.6 (H) 05/22/2021 09:50 AM    RBC 4.72 05/22/2021 09:50 AM    HGB 14.5 05/22/2021 09:50 AM    HCT 43.0 05/22/2021 09:50 AM    MCV 91.1 05/22/2021 09:50 AM    MCH 30.7 05/22/2021 09:50 AM    MCHC 33.7 05/22/2021 09:50 AM    RDW 12.7 05/22/2021 09:50 AM     05/22/2021 09:50 AM    MPV 12.9 (H) 05/22/2021 09:50 AM      Lab Results   Component Value Date/Time     05/22/2021 09:50 AM    K 4.4 05/22/2021 09:50 AM    CO2 24 05/22/2021 09:50 AM    BUN 12 05/22/2021 09:50 AM    CREATININE 0.9 05/22/2021 09:50 AM    CALCIUM 9.5 05/22/2021 09:50 AM    LABGLOM >60 05/22/2021 09:50 AM    GFRAA >60 05/22/2021 09:50 AM      Lab Results   Component Value Date/Time    TSH 2.320 05/22/2021 09:50 AM    T4FREE 1.27 08/14/2015 08:46 AM     Lab Results   Component Value Date    LABA1C 8.8 05/22/2021    GLUCOSE 252 05/22/2021    GLUCOSE 233 12/28/2011     Lab Results   Component Value Date    LABA1C 8.8 05/22/2021    LABA1C 9.0 07/17/2020    LABA1C 8.6 08/14/2015     Lab Results   Component Value Date    TRIG 95 05/22/2021    HDL 88 05/22/2021    LDLCALC 66 05/22/2021    CHOL 173 05/22/2021     Lab Results   Component Value Date    VITD25 34 08/14/2015       ASSESSMENT & RECOMMENDATIONS   Michele Torres, a 57 y.o.-old female seen in for the following issues     Diabetes Mellitus Type 1    · Patient's diabetes is uncontrol   · Will order pump upgrade with CGM  · Current pump settings:  basal rate 12a 0.825, 8:30a 1.2, 12p 1.1, 6p 0.850, CR 7, ISF 39, active insulin time 4 hrs  · Continue checking blood sugars 4 times a day before meals and at bedtime and send BS readings to our office in a week. · Discussed with patient A1c and blood sugar goals   · Patient will need routine diabetes maintenance and prevention  · Diabetes labs before next visit     Hypothyroidism    At goal, Continue levothyroxine 25 mcg daily     I personally reviewed external notes from PCP and other patient's care team providers, and personally interpreted labs associated with the above diagnosis. I also ordered labs to further assess and manage the above addressed medical conditions. Return in about 6 weeks (around 8/10/2021) for DM type 1, hypothyroidism. The above issues were reviewed with the patient who understood and agreed with the plan. Thank you for allowing us to participate in the care of this patient. Please do not hesitate to contact us with any additional questions. Diagnosis Orders   1. Type 1 diabetes mellitus with hyperglycemia (HCC)  Hemoglobin A1C   2. Vitamin D deficiency     3. Hypothyroidism, unspecified type       Ascencion Black MD  Endocrinologist, [facility] Diabetes Care and Endocrinology   [address] [place] [address]   Phone: [phone]  Fax: [phone]  --------------------------------------------  An electronic signature was used to authenticate this note.  Jarek Betts MD on 6/29/2021 at 4:18 PM

## 2021-07-06 RX ORDER — ERGOCALCIFEROL 1.25 MG/1
50000 CAPSULE ORAL WEEKLY
Qty: 12 CAPSULE | Refills: 3 | Status: SHIPPED
Start: 2021-07-06 | End: 2022-06-09 | Stop reason: SDUPTHER

## 2021-07-23 DIAGNOSIS — E10.65 TYPE 1 DIABETES MELLITUS WITH HYPERGLYCEMIA (HCC): Primary | ICD-10-CM

## 2021-07-23 RX ORDER — BLOOD SUGAR DIAGNOSTIC
1 STRIP MISCELLANEOUS 4 TIMES DAILY
Qty: 150 EACH | Refills: 11 | Status: SHIPPED
Start: 2021-07-23 | End: 2022-06-28 | Stop reason: SDUPTHER

## 2021-07-23 RX ORDER — LANCETS
EACH MISCELLANEOUS
Qty: 200 EACH | Refills: 11 | Status: SHIPPED | OUTPATIENT
Start: 2021-07-23

## 2021-08-10 ENCOUNTER — OFFICE VISIT (OUTPATIENT)
Dept: ENDOCRINOLOGY | Age: 61
End: 2021-08-10
Payer: COMMERCIAL

## 2021-08-10 VITALS
DIASTOLIC BLOOD PRESSURE: 72 MMHG | SYSTOLIC BLOOD PRESSURE: 160 MMHG | BODY MASS INDEX: 25.03 KG/M2 | HEIGHT: 69 IN | WEIGHT: 169 LBS

## 2021-08-10 DIAGNOSIS — E03.9 HYPOTHYROIDISM, UNSPECIFIED TYPE: ICD-10-CM

## 2021-08-10 DIAGNOSIS — E78.2 MIXED HYPERLIPIDEMIA: ICD-10-CM

## 2021-08-10 DIAGNOSIS — I10 ESSENTIAL HYPERTENSION: ICD-10-CM

## 2021-08-10 DIAGNOSIS — E10.65 TYPE 1 DIABETES MELLITUS WITH HYPERGLYCEMIA (HCC): Primary | ICD-10-CM

## 2021-08-10 DIAGNOSIS — E55.9 VITAMIN D DEFICIENCY: ICD-10-CM

## 2021-08-10 PROCEDURE — 99214 OFFICE O/P EST MOD 30 MIN: CPT | Performed by: CLINICAL NURSE SPECIALIST

## 2021-08-10 PROCEDURE — 3052F HG A1C>EQUAL 8.0%<EQUAL 9.0%: CPT | Performed by: CLINICAL NURSE SPECIALIST

## 2021-08-10 PROCEDURE — 95251 CONT GLUC MNTR ANALYSIS I&R: CPT | Performed by: CLINICAL NURSE SPECIALIST

## 2021-08-10 NOTE — PROGRESS NOTES
[address] [facility] Department of Endocrinology Diabetes and Metabolism   [address] [facility] [address]   Phone: [phone]  Fax: [phone]    Date of Service: 8/10/2021    Primary Care Physician: Aliyah Chapa MD  Referring physician: No ref. provider found  Provider: Bryan Elizabeth     Reason for the visit:  DM type 1      History of Present Illness: The history is provided by the patient. No  was used. Accuracy of the patient data is excellent. Christelle Feldman is a very pleasant 57 y.o. female seen today for diabetes management      Christelle Feldman was diagnosed with diabetes at age 27  and currently on 46G Medtronic insulin pump  cuerent settings basal rate 12a 0.55 , CR 13, ISF 70, active insulin time 3 hrs  The patient has been checking blood sugar 3 times a day   Most recent A1c results summarized below  Pt just had adjustment to insulin pump on Friday       Lab Results   Component Value Date    LABA1C 8.8 05/22/2021    LABA1C 9.0 07/17/2020    LABA1C 8.6 08/14/2015     Patient reported no hypoglycemia   The patient has been mindful of what has been eating and following diabetic diet as encouraged  I reviewed current medications and the patient has no issues with diabetes medications  Michele Torres needs eye exam  and denied any history of diabetic retinopathy   The patient performs her own feet care  Microvascular complications:  No Retinopathy, Nephropathy or Neuropathy   Macrovascular complications: no CAD, PVD, or Stroke  The patient receives Flushot every year    Exercise by swimming  and active at home      On Levothyroxine 25 mcg daily. Patient takes levothyroxine in the morning at empty stomach, wait one hour before eating , avoid multivitamins containing calcium  or iron with it.     PAST MEDICAL HISTORY   Past Medical History:   Diagnosis Date    Diabetes mellitus ([address]     INSULIN PUMP    Headache(784.0)     Hyperlipidemia     Hypertension  Hypothyroidism     Osteoarthritis     wrists    Thyroid disease     Type 2 diabetes mellitus without complication (HCC)     Type II or unspecified type diabetes mellitus without mention of complication, not stated as uncontrolled     unsure of type    Vitamin D deficiency        PAST SURGICAL HISTORY   Past Surgical History:   Procedure Laterality Date    COLONOSCOPY  03/26/2019    EYE SURGERY      LASIX    NECK SURGERY      TUBAL LIGATION      WRIST FRACTURE SURGERY      I&D right distal radius, with ex-fix       SOCIAL HISTORY   Tobacco:   reports that she quit smoking about 9 years ago. Her smoking use included cigarettes. She has a 5.00 pack-year smoking history. She has never used smokeless tobacco.  Alcohol:   reports current alcohol use of about 1.7 standard drinks of alcohol per week. Drugs:   reports no history of drug use. FAMILY HISTORY   Family History   Problem Relation Age of Onset    Dementia Mother     Heart Disease Father     Liver Cancer Sister        ALLERGIES AND DRUG REACTIONS   Allergies   Allergen Reactions    Pcn [Penicillins]     Sulfa Antibiotics        CURRENT MEDICATIONS   Current Outpatient Medications   Medication Sig Dispense Refill    blood glucose test strips (ACCU-CHEK GUIDE) strip 1 each by In Vitro route 4 times daily As needed.  150 each 11    Accu-Chek FastClix Lancets MISC To test 4x/day 200 each 11    vitamin D (ERGOCALCIFEROL) 1.25 MG (24668 UT) CAPS capsule Take 1 capsule by mouth once a week 12 capsule 3    Calcium Carbonate (CALCIUM 500 PO) Take by mouth      atorvastatin (LIPITOR) 10 MG tablet Take one tab Monday,wenesday,friday 12 tablet 3    amLODIPine (NORVASC) 5 MG tablet Take 1 tablet by mouth daily 90 tablet 3    levothyroxine (SYNTHROID) 25 MCG tablet Take 1 tablet by mouth Daily 90 tablet 3    insulin lispro (HUMALOG) 100 UNIT/ML injection vial Inject 3 Units into the skin continuous Indications: .8 units an hour pt adjusts as she eats Pt uses 140 units every 3 - 4 days in insulin pump 6 vial 1    lisinopril (PRINIVIL;ZESTRIL) 20 MG tablet Take 1 tablet by mouth daily 90 tablet 3    COMBIPATCH 0.05-0.14 MG/DAY Place 1 patch onto the skin twice a week. 8 patch 2    LANCETS by Does not apply route. Pt tests 3 times daily      INSULIN INFUSION PUMP by Does not apply route. HUMALOG INSULIN      Ascorbic Acid (VITAMIN C) 500 MG tablet Take 500 mg by mouth daily.  Cyanocobalamin (VITAMIN B-12 CR PO) Take 1 tablet by mouth daily.  VITAMIN E 400 mg by Does not apply route daily. No current facility-administered medications for this visit. Review of Systems  Constitutional: No fever, no chills, no diaphoresis, no generalized weakness. HEENT: No blurred vision, No sore throat, no ear pain, no hair loss  Neck: denied any neck swelling, difficulty swallowing,   Cardio-pulmonary: No CP, SOB or palpitation, No orthopnea or PND. No cough or wheezing. GI: No N/V/D, no constipation, No abdominal pain, no melena or hematochezia   : Denied any dysuria, hematuria, flank pain, discharge, or incontinence. Skin: denied any rash, ulcer, Hirsute, or hyperpigmentation. MSK: denied any joint deformity, joint pain/swelling, muscle pain, or back pain. Neuro: no numbness, no tingling, no weakness, _    OBJECTIVE    There were no vitals taken for this visit. BP Readings from Last 4 Encounters:   06/29/21 138/78   05/24/21 138/80   02/15/21 (!) 150/82   07/15/20 (!) 150/90     Wt Readings from Last 6 Encounters:   06/29/21 171 lb (77.6 kg)   05/24/21 173 lb (78.5 kg)   02/15/21 171 lb (77.6 kg)   07/15/20 173 lb (78.5 kg)   11/19/19 166 lb (75.3 kg)   06/10/14 172 lb (78 kg)       Physical examination:  General: awake alert, oriented x3, no abnormal position or movements. HEENT: normocephalic non-traumatic, no exophthalmos   Neck: supple, no LN enlargement, no thyromegaly, no thyroid tenderness, no JVD.   Pulm: Clear equal air entry no added sounds, no wheezing or rhonchi    CVS: S1 + S2, no murmur, no heave. Dorsalis pedis pulse palpable   Abd: soft lax, no tenderness, no organomegaly, audible bowel sounds. Skin: warm, no lesions, no rash. No callus, no Ulcers, No acanthosis nigricans  Musculoskeletal: No back tenderness, no kyphosis/scoliosis    Neuro: CN intact, Monofilament sensation decreased bilateral , muscle power normal  Psych: normal mood, and affect      Review of Laboratory Data:  I personally reviewed the following lab:  Lab Results   Component Value Date/Time    WBC 11.6 (H) 05/22/2021 09:50 AM    RBC 4.72 05/22/2021 09:50 AM    HGB 14.5 05/22/2021 09:50 AM    HCT 43.0 05/22/2021 09:50 AM    MCV 91.1 05/22/2021 09:50 AM    MCH 30.7 05/22/2021 09:50 AM    MCHC 33.7 05/22/2021 09:50 AM    RDW 12.7 05/22/2021 09:50 AM     05/22/2021 09:50 AM    MPV 12.9 (H) 05/22/2021 09:50 AM      Lab Results   Component Value Date/Time     05/22/2021 09:50 AM    K 4.4 05/22/2021 09:50 AM    CO2 24 05/22/2021 09:50 AM    BUN 12 05/22/2021 09:50 AM    CREATININE 0.9 05/22/2021 09:50 AM    CALCIUM 9.5 05/22/2021 09:50 AM    LABGLOM >60 05/22/2021 09:50 AM    GFRAA >60 05/22/2021 09:50 AM      Lab Results   Component Value Date/Time    TSH 2.320 05/22/2021 09:50 AM    T4FREE 1.27 08/14/2015 08:46 AM     Lab Results   Component Value Date    LABA1C 8.8 05/22/2021    GLUCOSE 252 05/22/2021    GLUCOSE 233 12/28/2011     Lab Results   Component Value Date    LABA1C 8.8 05/22/2021    LABA1C 9.0 07/17/2020    LABA1C 8.6 08/14/2015     Lab Results   Component Value Date    TRIG 95 05/22/2021    HDL 88 05/22/2021    LDLCALC 66 05/22/2021    CHOL 173 05/22/2021     Lab Results   Component Value Date    VITD25 34 08/14/2015       ASSESSMENT & RECOMMENDATIONS   Michele Torres, a 57 y.o.-old female seen in for the following issues      Diagnosis Orders   1. Type 1 diabetes mellitus with hyperglycemia (HCC)     2. Hypothyroidism, unspecified type     3. Mixed hyperlipidemia     4. Essential hypertension     5. Vitamin D deficiency         Diabetes Mellitus Type 1    · Patient's diabetes diabetes variable but usually stable. Insulin pump settings were just adjusted on Friday. Blood glucose levels have improved since that time. Plan: Continue insulin pump settings as above for now. Patient understands goal is to remain in auto mode. Insulin pump and CGM reviewed. · The patient was advised to check blood sugars 4 times a day before meals and at bedtime and send BS readings to our office in 2 weeks  · Vies patient to call if blood glucose less than 70 or greater than 250 3 consecutive times. l  · The patient counseled about the complications of uncontrolled diabetes   · Patient was counselled about the importance of self-blood glucose monitoring and eating consistent carb diet to avoid blood sugar fluctuations   · Discussed lifestyle changes including diet and exercise with patient; recommended 150 minutes of moderate intensity exercise per week. ·          Hypothyroidism  -Continue levothyroxine 25 mcg 1 tablet once daily. Last thyroid function test within normal limits. Patient taking on an empty stomach least 30 minutes before breakfast and without combination of other medications. Counseled on symptoms of hypo-/hyperthyroidism patient verbalized understanding    Mixed hyperlipidemia  -Continue statin. Encourage diet and exercise    Hypertension  -BP goal less than 140/90. Following with PCP. Patient is on lisinopril and Norvasc    Vitamin D deficiency  Continue vitamin D supplementation    I personally spent greater than 30 minutes reviewing external notes from PCP and other patient's care team providers, and personally interpreted labs associated with the above diagnosis. I also ordered labs to further assess and manage the above addressed medical conditions. Return in about 3 months (around 11/10/2021).     The above issues were reviewed with the patient who understood and agreed with the plan. Thank you for allowing us to participate in the care of this patient. Please do not hesitate to contact us with any additional questions. Deyvi MONTEIRO   Endocrinologist, [facility] Diabetes Care and Endocrinology   [address] [facility] [address]   Phone: [phone]  Fax: [phone]  --------------------------------------------  An electronic signature was used to authenticate this note.  Deyvi MONTEIRO on 8/10/2021 at 2:45 PM

## 2021-11-29 ENCOUNTER — HOSPITAL ENCOUNTER (OUTPATIENT)
Age: 61
Discharge: HOME | End: 2021-11-29
Payer: COMMERCIAL

## 2021-11-29 DIAGNOSIS — I10 ESSENTIAL HYPERTENSION: ICD-10-CM

## 2021-11-29 DIAGNOSIS — E10.9 TYPE 1 DIABETES MELLITUS WITHOUT COMPLICATION (HCC): ICD-10-CM

## 2021-11-29 DIAGNOSIS — E78.2 MIXED HYPERLIPIDEMIA: ICD-10-CM

## 2021-11-29 DIAGNOSIS — E03.9 ACQUIRED HYPOTHYROIDISM: ICD-10-CM

## 2021-11-29 LAB
ALBUMIN SERPL-MCNC: 4.1 G/DL (ref 3.5–5.2)
ALP BLD-CCNC: 85 U/L (ref 35–104)
ALT SERPL-CCNC: 20 U/L (ref 0–32)
ANION GAP SERPL CALCULATED.3IONS-SCNC: 10 MMOL/L (ref 7–16)
AST SERPL-CCNC: 19 U/L (ref 0–31)
BILIRUB SERPL-MCNC: 0.7 MG/DL (ref 0–1.2)
BUN BLDV-MCNC: 22 MG/DL (ref 6–23)
CALCIUM SERPL-MCNC: 9.8 MG/DL (ref 8.6–10.2)
CHLORIDE BLD-SCNC: 102 MMOL/L (ref 98–107)
CHOLESTEROL, TOTAL: 176 MG/DL (ref 0–199)
CO2: 25 MMOL/L (ref 22–29)
CREAT SERPL-MCNC: 0.7 MG/DL (ref 0.5–1)
GFR AFRICAN AMERICAN: >60
GFR NON-AFRICAN AMERICAN: >60 ML/MIN/1.73
GLUCOSE BLD-MCNC: 233 MG/DL (ref 74–99)
HBA1C MFR BLD: 7.7 % (ref 4–5.6)
HDLC SERPL-MCNC: 83 MG/DL
LDL CHOLESTEROL CALCULATED: 83 MG/DL (ref 0–99)
POTASSIUM SERPL-SCNC: 4.3 MMOL/L (ref 3.5–5)
SODIUM BLD-SCNC: 137 MMOL/L (ref 132–146)
TOTAL PROTEIN: 7.1 G/DL (ref 6.4–8.3)
TRIGL SERPL-MCNC: 52 MG/DL (ref 0–149)
TSH SERPL DL<=0.05 MIU/L-ACNC: 3.3 UIU/ML (ref 0.27–4.2)
VLDLC SERPL CALC-MCNC: 10 MG/DL

## 2021-11-29 PROCEDURE — 84443 ASSAY THYROID STIM HORMONE: CPT

## 2021-11-29 PROCEDURE — 36415 COLL VENOUS BLD VENIPUNCTURE: CPT

## 2021-11-29 PROCEDURE — 83036 HEMOGLOBIN GLYCOSYLATED A1C: CPT

## 2021-11-29 PROCEDURE — 80053 COMPREHEN METABOLIC PANEL: CPT

## 2021-11-29 PROCEDURE — 80061 LIPID PANEL: CPT

## 2021-12-02 ENCOUNTER — OFFICE VISIT (OUTPATIENT)
Dept: PRIMARY CARE CLINIC | Age: 61
End: 2021-12-02
Payer: COMMERCIAL

## 2021-12-02 VITALS
OXYGEN SATURATION: 99 % | HEART RATE: 92 BPM | RESPIRATION RATE: 18 BRPM | TEMPERATURE: 97.7 F | DIASTOLIC BLOOD PRESSURE: 72 MMHG | BODY MASS INDEX: 24.71 KG/M2 | WEIGHT: 163 LBS | HEIGHT: 68 IN | SYSTOLIC BLOOD PRESSURE: 138 MMHG

## 2021-12-02 DIAGNOSIS — E10.9 TYPE 1 DIABETES MELLITUS WITHOUT COMPLICATION (HCC): ICD-10-CM

## 2021-12-02 DIAGNOSIS — E55.9 VITAMIN D DEFICIENCY: ICD-10-CM

## 2021-12-02 DIAGNOSIS — E78.00 PURE HYPERCHOLESTEROLEMIA: ICD-10-CM

## 2021-12-02 DIAGNOSIS — E03.9 ACQUIRED HYPOTHYROIDISM: ICD-10-CM

## 2021-12-02 DIAGNOSIS — I10 ESSENTIAL HYPERTENSION: Primary | ICD-10-CM

## 2021-12-02 PROBLEM — E10.65 TYPE 1 DIABETES MELLITUS WITH HYPERGLYCEMIA (HCC): Status: RESOLVED | Noted: 2021-06-29 | Resolved: 2021-12-02

## 2021-12-02 LAB
CREATININE URINE POCT: 50
MICROALBUMIN/CREAT 24H UR: 10 MG/G{CREAT}
MICROALBUMIN/CREAT UR-RTO: <30

## 2021-12-02 PROCEDURE — 82044 UR ALBUMIN SEMIQUANTITATIVE: CPT | Performed by: INTERNAL MEDICINE

## 2021-12-02 PROCEDURE — 3051F HG A1C>EQUAL 7.0%<8.0%: CPT | Performed by: INTERNAL MEDICINE

## 2021-12-02 PROCEDURE — 99214 OFFICE O/P EST MOD 30 MIN: CPT | Performed by: INTERNAL MEDICINE

## 2021-12-02 NOTE — PROGRESS NOTES
Michele Torres  12/2/21     Chief Complaint   Patient presents with    Hypertension     6 months w labs         Allergies   Allergen Reactions    Pcn [Penicillins]     Sulfa Antibiotics         Current Outpatient Medications   Medication Sig Dispense Refill    blood glucose test strips (ACCU-CHEK GUIDE) strip 1 each by In Vitro route 4 times daily As needed. 150 each 11    Accu-Chek FastClix Lancets MISC To test 4x/day 200 each 11    vitamin D (ERGOCALCIFEROL) 1.25 MG (94927 UT) CAPS capsule Take 1 capsule by mouth once a week 12 capsule 3    Calcium Carbonate (CALCIUM 500 PO) Take by mouth      atorvastatin (LIPITOR) 10 MG tablet Take one tab Monday,wenesday,friday 12 tablet 3    levothyroxine (SYNTHROID) 25 MCG tablet Take 1 tablet by mouth Daily 90 tablet 3    insulin lispro (HUMALOG) 100 UNIT/ML injection vial Inject 3 Units into the skin continuous Indications: .8 units an hour pt adjusts as she eats Pt uses 140 units every 3 - 4 days in insulin pump 6 vial 1    lisinopril (PRINIVIL;ZESTRIL) 20 MG tablet Take 1 tablet by mouth daily 90 tablet 3    COMBIPATCH 0.05-0.14 MG/DAY Place 1 patch onto the skin twice a week. 8 patch 2    LANCETS by Does not apply route. Pt tests 3 times daily      INSULIN INFUSION PUMP by Does not apply route. HUMALOG INSULIN      Ascorbic Acid (VITAMIN C) 500 MG tablet Take 500 mg by mouth daily.  Cyanocobalamin (VITAMIN B-12 CR PO) Take 1 tablet by mouth daily.  VITAMIN E 400 mg by Does not apply route daily.  amLODIPine (NORVASC) 5 MG tablet Take 1 tablet by mouth daily 90 tablet 3     No current facility-administered medications for this visit. HPI:     Review of Systems: as per HPI      Physical Exam:    Patient is a 57 y.o. female. Patient appears to be in no distress. Breathing comfortably. Ambulates without assistance. HEENT: normal.  Neck supple, no adenopathy or bruits. Heart RR, no MGR. Lungs clear. Abd: normal  Ext: no edema. Peripheral pulses: normal.  No neurologic deficits noted. Lab Results   Component Value Date    WBC 11.6 (H) 05/22/2021    HGB 14.5 05/22/2021    HCT 43.0 05/22/2021     05/22/2021    CHOL 176 11/29/2021    TRIG 52 11/29/2021    HDL 83 11/29/2021    ALT 20 11/29/2021    AST 19 11/29/2021    TSH 3.300 11/29/2021    INR 1.2 12/28/2011    LABA1C 7.7 (H) 11/29/2021      Lab Results   Component Value Date     11/29/2021    K 4.3 11/29/2021     11/29/2021    CO2 25 11/29/2021    BUN 22 11/29/2021    CREATININE 0.7 11/29/2021    GLUCOSE 233 (H) 11/29/2021    CALCIUM 9.8 11/29/2021    PROT 7.1 11/29/2021    LABALBU 4.1 11/29/2021    BILITOT 0.7 11/29/2021    ALKPHOS 85 11/29/2021    AST 19 11/29/2021    ALT 20 11/29/2021    LABGLOM >60 11/29/2021    GFRAA >60 11/29/2021            Assessment:    Cody Rodriguez was seen today for hypertension.     Diagnoses and all orders for this visit:    Type 1 diabetes mellitus without complication ([facility] [address]  -     POCT Microalbumin          Discussion Notes:

## 2021-12-02 NOTE — PROGRESS NOTES
Michele Torres  12/2/21     Chief Complaint   Patient presents with    Hypertension     6 months w labs         Allergies   Allergen Reactions    Pcn [Penicillins]     Sulfa Antibiotics         Current Outpatient Medications   Medication Sig Dispense Refill    blood glucose test strips (ACCU-CHEK GUIDE) strip 1 each by In Vitro route 4 times daily As needed. 150 each 11    Accu-Chek FastClix Lancets MISC To test 4x/day 200 each 11    vitamin D (ERGOCALCIFEROL) 1.25 MG (02432 UT) CAPS capsule Take 1 capsule by mouth once a week 12 capsule 3    Calcium Carbonate (CALCIUM 500 PO) Take by mouth      atorvastatin (LIPITOR) 10 MG tablet Take one tab Monday,wenesday,friday 12 tablet 3    levothyroxine (SYNTHROID) 25 MCG tablet Take 1 tablet by mouth Daily 90 tablet 3    insulin lispro (HUMALOG) 100 UNIT/ML injection vial Inject 3 Units into the skin continuous Indications: .8 units an hour pt adjusts as she eats Pt uses 140 units every 3 - 4 days in insulin pump 6 vial 1    lisinopril (PRINIVIL;ZESTRIL) 20 MG tablet Take 1 tablet by mouth daily 90 tablet 3    COMBIPATCH 0.05-0.14 MG/DAY Place 1 patch onto the skin twice a week. 8 patch 2    LANCETS by Does not apply route. Pt tests 3 times daily      INSULIN INFUSION PUMP by Does not apply route. HUMALOG INSULIN      Ascorbic Acid (VITAMIN C) 500 MG tablet Take 500 mg by mouth daily.  Cyanocobalamin (VITAMIN B-12 CR PO) Take 1 tablet by mouth daily.  VITAMIN E 400 mg by Does not apply route daily.  amLODIPine (NORVASC) 5 MG tablet Take 1 tablet by mouth daily 90 tablet 3     No current facility-administered medications for this visit. HPI: Patient comes in for routine follow-up visit and to review labs. Currently she feels well. She has been watching her diet better and has a new insulin pump which seems to be helping quite a bit. Her hemoglobin A1c has come down to 7.7% from 8.8% 6 months ago.   She denies any chest pain or shortness of breath. She remains on all her usual meds the same as listed on her med list, which was reviewed with her. Review of Systems: as per HPI      Physical Exam:    Patient is a 57 y.o. female. Patient appears to be in no distress. Breathing comfortably. Ambulates without assistance. HEENT: normal.  Neck supple, no adenopathy or bruits. Heart RR, no MGR. Lungs clear. Abd: normal  Ext: no edema. Peripheral pulses: normal.  No neurologic deficits noted. Lab Results   Component Value Date    WBC 11.6 (H) 05/22/2021    HGB 14.5 05/22/2021    HCT 43.0 05/22/2021     05/22/2021    CHOL 176 11/29/2021    TRIG 52 11/29/2021    HDL 83 11/29/2021    ALT 20 11/29/2021    AST 19 11/29/2021    TSH 3.300 11/29/2021    INR 1.2 12/28/2011    LABA1C 7.7 (H) 11/29/2021      Lab Results   Component Value Date     11/29/2021    K 4.3 11/29/2021     11/29/2021    CO2 25 11/29/2021    BUN 22 11/29/2021    CREATININE 0.7 11/29/2021    GLUCOSE 233 (H) 11/29/2021    CALCIUM 9.8 11/29/2021    PROT 7.1 11/29/2021    LABALBU 4.1 11/29/2021    BILITOT 0.7 11/29/2021    ALKPHOS 85 11/29/2021    AST 19 11/29/2021    ALT 20 11/29/2021    LABGLOM >60 11/29/2021    GFRAA >60 11/29/2021            Assessment:    Chuyita Ball was seen today for hypertension. Diagnoses and all orders for this visit:    Essential hypertension    Type 1 diabetes mellitus without complication ([address] improving control with a hemoglobin A1c now at 7.7%. She is followed by her new endocrinologist.  -     POCT Microalbumin    Acquired hypothyroidism    Pure hypercholesterolemia    Vitamin D deficiency          Discussion Notes: She will continue all her usual meds the same as listed on her med list.  Her insulin is managed by her endocrinologist.  She is encouraged to follow a heart healthy diet and exercise on a regular basis.   Return here as needed or in 6 months for her annual physical.  She will follow-up with her endocrinologist as per his instructions.

## 2021-12-07 LAB — DIABETIC RETINOPATHY: NEGATIVE

## 2021-12-13 RX ORDER — LISINOPRIL 20 MG/1
20 TABLET ORAL DAILY
Qty: 90 TABLET | Refills: 3 | Status: SHIPPED | OUTPATIENT
Start: 2021-12-13

## 2021-12-20 ENCOUNTER — OFFICE VISIT (OUTPATIENT)
Dept: ENDOCRINOLOGY | Age: 61
End: 2021-12-20
Payer: COMMERCIAL

## 2021-12-20 VITALS
SYSTOLIC BLOOD PRESSURE: 149 MMHG | WEIGHT: 162 LBS | HEART RATE: 94 BPM | DIASTOLIC BLOOD PRESSURE: 71 MMHG | HEIGHT: 68 IN | BODY MASS INDEX: 24.55 KG/M2

## 2021-12-20 DIAGNOSIS — E78.2 MIXED HYPERLIPIDEMIA: ICD-10-CM

## 2021-12-20 DIAGNOSIS — E10.65 TYPE 1 DIABETES MELLITUS WITH HYPERGLYCEMIA (HCC): ICD-10-CM

## 2021-12-20 DIAGNOSIS — E03.9 HYPOTHYROIDISM, UNSPECIFIED TYPE: Primary | ICD-10-CM

## 2021-12-20 DIAGNOSIS — E55.9 VITAMIN D DEFICIENCY: ICD-10-CM

## 2021-12-20 PROCEDURE — 99214 OFFICE O/P EST MOD 30 MIN: CPT | Performed by: INTERNAL MEDICINE

## 2021-12-20 PROCEDURE — 3051F HG A1C>EQUAL 7.0%<8.0%: CPT | Performed by: INTERNAL MEDICINE

## 2021-12-20 PROCEDURE — 95251 CONT GLUC MNTR ANALYSIS I&R: CPT | Performed by: INTERNAL MEDICINE

## 2021-12-20 NOTE — PROGRESS NOTES
[address] [place] [address] Department of Endocrinology Diabetes and Metabolism   [address] [facility] [address]   Phone: [phone]  Fax: [phone]    Date of Service: 12/20/2021  Primary Care Physician: Lilia Angela MD  Provider: Vivi Stokes MD     Reason for the visit:  DM type 1     History of Present Illness: The history is provided by the patient. No  was used. Accuracy of the patient data is excellent. Greer Bolivar is a very pleasant 57 y.o. female seen today for diabetes management     Greer Bolivar was diagnosed with diabetes at age 27  and currently on 12g Medtronic insulin pump  cuerent settings basal rate 12a 0.55, CR 13, ISF 70, target 100-150, active insulin time 3 hrs  Uses CGM, I have reviewed both pump and CGM download and BG improved significantly since started on insulin pump   Most recent A1c results summarized below  Lab Results   Component Value Date    LABA1C 7.7 11/29/2021    LABA1C 8.8 05/22/2021    LABA1C 9.0 07/17/2020     The patient has been mindful of what has been eating and following diabetic diet as encouraged  I reviewed current medications and the patient has no issues with diabetes medications  Greer Bolivar is up to date with eye exam and denied any history of diabetic retinopathy   The patient performs her own feet care  Microvascular complications:  No Retinopathy, Nephropathy or Neuropathy   Macrovascular complications: no CAD, PVD, or Stroke  The patient receives Flushot every year    On Levothyroxine 25 mcg daily. Patient takes levothyroxine in the morning at empty stomach, wait one hour before eating , avoid multivitamins containing calcium  or iron with it.   Lab Results   Component Value Date/Time    TSH 3.300 11/29/2021 08:03 AM    T4FREE 1.27 08/14/2015 08:46 AM         PAST MEDICAL HISTORY   Past Medical History:   Diagnosis Date    Diabetes mellitus ([address]     INSULIN PUMP    Headache(784.0)     Hyperlipidemia  Hypertension     Hypothyroidism     Osteoarthritis     wrists    Thyroid disease     Type 2 diabetes mellitus without complication (HCC)     Type II or unspecified type diabetes mellitus without mention of complication, not stated as uncontrolled     unsure of type    Vitamin D deficiency        PAST SURGICAL HISTORY   Past Surgical History:   Procedure Laterality Date    COLONOSCOPY  03/26/2019    EYE SURGERY      LASIX    NECK SURGERY      TUBAL LIGATION      WRIST FRACTURE SURGERY      I&D right distal radius, with ex-fix       SOCIAL HISTORY   Tobacco:   reports that she quit smoking about 10 years ago. Her smoking use included cigarettes. She has a 5.00 pack-year smoking history. She has never used smokeless tobacco.  Alcohol:   reports current alcohol use of about 1.7 standard drinks of alcohol per week. Drugs:   reports no history of drug use. FAMILY HISTORY   Family History   Problem Relation Age of Onset    Dementia Mother     Heart Disease Father     Liver Cancer Sister        ALLERGIES AND DRUG REACTIONS   Allergies   Allergen Reactions    Pcn [Penicillins]     Sulfa Antibiotics        CURRENT MEDICATIONS   Current Outpatient Medications   Medication Sig Dispense Refill    lisinopril (PRINIVIL;ZESTRIL) 20 MG tablet Take 1 tablet by mouth daily 90 tablet 3    blood glucose test strips (ACCU-CHEK GUIDE) strip 1 each by In Vitro route 4 times daily As needed.  150 each 11    Accu-Chek FastClix Lancets MISC To test 4x/day 200 each 11    vitamin D (ERGOCALCIFEROL) 1.25 MG (20875 UT) CAPS capsule Take 1 capsule by mouth once a week 12 capsule 3    Calcium Carbonate (CALCIUM 500 PO) Take by mouth      atorvastatin (LIPITOR) 10 MG tablet Take one tab Monday,wenesday,friday 12 tablet 3    amLODIPine (NORVASC) 5 MG tablet Take 1 tablet by mouth daily 90 tablet 3    levothyroxine (SYNTHROID) 25 MCG tablet Take 1 tablet by mouth Daily 90 tablet 3    insulin lispro (HUMALOG) 100 UNIT/ML injection vial Inject 3 Units into the skin continuous Indications: .8 units an hour pt adjusts as she eats Pt uses 140 units every 3 - 4 days in insulin pump 6 vial 1    COMBIPATCH 0.05-0.14 MG/DAY Place 1 patch onto the skin twice a week. 8 patch 2    LANCETS by Does not apply route. Pt tests 3 times daily      INSULIN INFUSION PUMP by Does not apply route. HUMALOG INSULIN      Ascorbic Acid (VITAMIN C) 500 MG tablet Take 500 mg by mouth daily.  Cyanocobalamin (VITAMIN B-12 CR PO) Take 1 tablet by mouth daily.  VITAMIN E 400 mg by Does not apply route daily. No current facility-administered medications for this visit. Review of Systems  Constitutional: No fever, no chills, no diaphoresis, no generalized weakness. HEENT: No blurred vision, No sore throat, no ear pain, no hair loss  Neck: denied any neck swelling, difficulty swallowing,   Cardio-pulmonary: No CP, SOB or palpitation, No orthopnea or PND. No cough or wheezing. GI: No N/V/D, no constipation, No abdominal pain, no melena or hematochezia   : Denied any dysuria, hematuria, flank pain, discharge, or incontinence. Skin: denied any rash, ulcer, Hirsute, or hyperpigmentation. MSK: denied any joint deformity, joint pain/swelling, muscle pain, or back pain. Neuro: no numbness, no tingling, no weakness, _    OBJECTIVE    BP (!) 149/71   Pulse 94   Ht 5' 8\" (1.727 m)   Wt 162 lb (73.5 kg)   BMI 24.63 kg/m²   BP Readings from Last 4 Encounters:   12/20/21 (!) 149/71   12/02/21 138/72   08/10/21 (!) 160/72   06/29/21 138/78     Wt Readings from Last 6 Encounters:   12/20/21 162 lb (73.5 kg)   12/02/21 163 lb (73.9 kg)   08/10/21 169 lb (76.7 kg)   06/29/21 171 lb (77.6 kg)   05/24/21 173 lb (78.5 kg)   02/15/21 171 lb (77.6 kg)     Physical examination:  General: awake alert, oriented x3, no abnormal position or movements.   HEENT: normocephalic non-traumatic, no exophthalmos   Neck: supple, no LN enlargement, no thyromegaly, no thyroid tenderness, no JVD. Pulm: Clear equal air entry no added sounds, no wheezing or rhonchi    CVS: S1 + S2, no murmur, no heave. Dorsalis pedis pulse palpable   Abd: soft lax, no tenderness, no organomegaly, audible bowel sounds. Skin: warm, no lesions, no rash.  No callus, no Ulcers, No acanthosis nigricans  Musculoskeletal: No back tenderness, no kyphosis/scoliosis    Neuro: CN intact, sensation present bilateral , muscle power normal  Psych: normal mood, and affect    Review of Laboratory Data:  I personally reviewed the following lab:  Lab Results   Component Value Date/Time    WBC 11.6 (H) 05/22/2021 09:50 AM    RBC 4.72 05/22/2021 09:50 AM    HGB 14.5 05/22/2021 09:50 AM    HCT 43.0 05/22/2021 09:50 AM    MCV 91.1 05/22/2021 09:50 AM    MCH 30.7 05/22/2021 09:50 AM    MCHC 33.7 05/22/2021 09:50 AM    RDW 12.7 05/22/2021 09:50 AM     05/22/2021 09:50 AM    MPV 12.9 (H) 05/22/2021 09:50 AM      Lab Results   Component Value Date/Time     11/29/2021 08:03 AM    K 4.3 11/29/2021 08:03 AM    CO2 25 11/29/2021 08:03 AM    BUN 22 11/29/2021 08:03 AM    CREATININE 0.7 11/29/2021 08:03 AM    CALCIUM 9.8 11/29/2021 08:03 AM    LABGLOM >60 11/29/2021 08:03 AM    GFRAA >60 11/29/2021 08:03 AM      Lab Results   Component Value Date/Time    TSH 3.300 11/29/2021 08:03 AM    T4FREE 1.27 08/14/2015 08:46 AM     Lab Results   Component Value Date    LABA1C 7.7 11/29/2021    GLUCOSE 233 11/29/2021    GLUCOSE 233 12/28/2011    MALBCR <30 12/02/2021     Lab Results   Component Value Date    LABA1C 7.7 11/29/2021    LABA1C 8.8 05/22/2021    LABA1C 9.0 07/17/2020     Lab Results   Component Value Date    TRIG 52 11/29/2021    HDL 83 11/29/2021    LDLCALC 83 11/29/2021    CHOL 176 11/29/2021     Lab Results   Component Value Date    VITD25 34 08/14/2015       ASSESSMENT & RECOMMENDATIONS   Michele Torres, a 57 y.o.-old female seen in for the following issues     Diabetes Mellitus Type 1    · Improving control

## 2022-01-18 DIAGNOSIS — E10.65 TYPE 1 DIABETES MELLITUS WITH HYPERGLYCEMIA (HCC): ICD-10-CM

## 2022-01-18 DIAGNOSIS — E03.9 HYPOTHYROIDISM, UNSPECIFIED TYPE: Primary | ICD-10-CM

## 2022-01-18 RX ORDER — BLOOD-GLUCOSE SENSOR
EACH MISCELLANEOUS
Qty: 5 EACH | Refills: 5 | Status: SHIPPED | OUTPATIENT
Start: 2022-01-18

## 2022-01-25 RX ORDER — ATORVASTATIN CALCIUM 10 MG/1
TABLET, FILM COATED ORAL
Qty: 12 TABLET | Refills: 3 | Status: SHIPPED
Start: 2022-01-25 | End: 2022-05-16 | Stop reason: SDUPTHER

## 2022-02-01 ENCOUNTER — TELEPHONE (OUTPATIENT)
Dept: ENDOCRINOLOGY | Age: 62
End: 2022-02-01

## 2022-03-10 DIAGNOSIS — Z78.0 MENOPAUSE: Primary | ICD-10-CM

## 2022-03-10 RX ORDER — ESTRADIOL/NORETHINDRONE ACETATE TRANSDERMAL SYSTEM .05; .14 MG/D; MG/D
PATCH, EXTENDED RELEASE TRANSDERMAL
Qty: 8 PATCH | Refills: 2 | Status: SHIPPED
Start: 2022-03-10 | End: 2022-10-25 | Stop reason: SDUPTHER

## 2022-04-20 ENCOUNTER — HOSPITAL ENCOUNTER (OUTPATIENT)
Age: 62
Discharge: HOME | End: 2022-04-20
Payer: COMMERCIAL

## 2022-04-20 DIAGNOSIS — E10.65 TYPE 1 DIABETES MELLITUS WITH HYPERGLYCEMIA (HCC): ICD-10-CM

## 2022-04-20 LAB
ANION GAP SERPL CALCULATED.3IONS-SCNC: 9 MMOL/L (ref 7–16)
BUN BLDV-MCNC: 15 MG/DL (ref 6–23)
CALCIUM SERPL-MCNC: 9.5 MG/DL (ref 8.6–10.2)
CHLORIDE BLD-SCNC: 98 MMOL/L (ref 98–107)
CO2: 29 MMOL/L (ref 22–29)
CREAT SERPL-MCNC: 0.6 MG/DL (ref 0.5–1)
GFR AFRICAN AMERICAN: >60
GFR NON-AFRICAN AMERICAN: >60 ML/MIN/1.73
GLUCOSE BLD-MCNC: 282 MG/DL (ref 74–99)
POTASSIUM SERPL-SCNC: 4.5 MMOL/L (ref 3.5–5)
SODIUM BLD-SCNC: 136 MMOL/L (ref 132–146)

## 2022-04-20 PROCEDURE — 80048 BASIC METABOLIC PNL TOTAL CA: CPT

## 2022-04-20 PROCEDURE — 36415 COLL VENOUS BLD VENIPUNCTURE: CPT

## 2022-04-20 PROCEDURE — 84681 ASSAY OF C-PEPTIDE: CPT

## 2022-04-21 ENCOUNTER — OFFICE VISIT (OUTPATIENT)
Dept: ENDOCRINOLOGY | Age: 62
End: 2022-04-21
Payer: COMMERCIAL

## 2022-04-21 VITALS
HEIGHT: 68 IN | SYSTOLIC BLOOD PRESSURE: 147 MMHG | DIASTOLIC BLOOD PRESSURE: 71 MMHG | OXYGEN SATURATION: 99 % | HEART RATE: 92 BPM | WEIGHT: 160.2 LBS | RESPIRATION RATE: 16 BRPM | BODY MASS INDEX: 24.28 KG/M2

## 2022-04-21 DIAGNOSIS — E03.9 HYPOTHYROIDISM, UNSPECIFIED TYPE: ICD-10-CM

## 2022-04-21 DIAGNOSIS — E78.2 MIXED HYPERLIPIDEMIA: ICD-10-CM

## 2022-04-21 DIAGNOSIS — E10.65 TYPE 1 DIABETES MELLITUS WITH HYPERGLYCEMIA (HCC): Primary | ICD-10-CM

## 2022-04-21 DIAGNOSIS — E55.9 VITAMIN D DEFICIENCY: ICD-10-CM

## 2022-04-21 LAB — HBA1C MFR BLD: 8.7 %

## 2022-04-21 PROCEDURE — 99214 OFFICE O/P EST MOD 30 MIN: CPT | Performed by: INTERNAL MEDICINE

## 2022-04-21 PROCEDURE — 83036 HEMOGLOBIN GLYCOSYLATED A1C: CPT | Performed by: INTERNAL MEDICINE

## 2022-04-21 PROCEDURE — 3052F HG A1C>EQUAL 8.0%<EQUAL 9.0%: CPT | Performed by: INTERNAL MEDICINE

## 2022-04-21 RX ORDER — INSULIN LISPRO 100 [IU]/ML
INJECTION, SOLUTION INTRAVENOUS; SUBCUTANEOUS
Qty: 30 ML | Refills: 5 | Status: SHIPPED | OUTPATIENT
Start: 2022-04-21

## 2022-04-21 NOTE — PROGRESS NOTES
[address] [place] [address] Department of Endocrinology Diabetes and Metabolism   [address] [facility] [address]   Phone: [phone]  Fax: [phone]    Date of Service: 4/21/2022  Primary Care Physician: Maddie Spence MD  Provider: Chadwick Odom MD     Reason for the visit:  DM type 1     History of Present Illness: The history is provided by the patient. No  was used. Accuracy of the patient data is excellent. Vineet Tarango is a very pleasant 60 y.o. female seen today for diabetes management     Vineet Tarango was diagnosed with diabetes at age 27  and currently on Medtronic insulin pump  Pt admit poor compliance with diet recently. She also wasn't using Auto-mode system  cuerent settings basal rate 12a 0.55, CR 13, ISF 70, target 100-150, active insulin time 3 hrs  Most recent A1c results summarized below  Lab Results   Component Value Date    LABA1C 8.7 04/21/2022    LABA1C 7.7 11/29/2021    LABA1C 8.8 05/22/2021     The patient has been mindful of what has been eating and following diabetic diet as encouraged  I reviewed current medications and the patient has no issues with diabetes medications  Vineet Tarango is up to date with eye exam and denied any history of diabetic retinopathy   The patient performs her own feet care  Microvascular complications:  No Retinopathy, Nephropathy or Neuropathy   Macrovascular complications: no CAD, PVD, or Stroke  The patient receives Flushot every year    On Levothyroxine 25 mcg daily. Patient takes levothyroxine in the morning at empty stomach, wait one hour before eating , avoid multivitamins containing calcium  or iron with it.   Lab Results   Component Value Date/Time    TSH 3.300 11/29/2021 08:03 AM    T4FREE 1.27 08/14/2015 08:46 AM         PAST MEDICAL HISTORY   Past Medical History:   Diagnosis Date    Diabetes mellitus ([address]     INSULIN PUMP    Headache(784.0)     Hyperlipidemia     Hypertension     Hypothyroidism  Osteoarthritis     wrists    Thyroid disease     Type 2 diabetes mellitus without complication (HCC)     Type II or unspecified type diabetes mellitus without mention of complication, not stated as uncontrolled     unsure of type    Vitamin D deficiency        PAST SURGICAL HISTORY   Past Surgical History:   Procedure Laterality Date    COLONOSCOPY  03/26/2019    EYE SURGERY      LASIX    NECK SURGERY      TUBAL LIGATION      WRIST FRACTURE SURGERY      I&D right distal radius, with ex-fix       SOCIAL HISTORY   Tobacco:   reports that she quit smoking about 10 years ago. Her smoking use included cigarettes. She has a 5.00 pack-year smoking history. She has never used smokeless tobacco.  Alcohol:   reports current alcohol use of about 1.7 standard drinks of alcohol per week. Drugs:   reports no history of drug use. FAMILY HISTORY   Family History   Problem Relation Age of Onset    Dementia Mother     Heart Disease Father     Liver Cancer Sister        ALLERGIES AND DRUG REACTIONS   Allergies   Allergen Reactions    Pcn [Penicillins]     Sulfa Antibiotics        CURRENT MEDICATIONS   Current Outpatient Medications   Medication Sig Dispense Refill    estradiol-norethindrone (COMBIPATCH) 0.05-0.14 MG/DAY Place 1 patch onto the skin twice a week. 8 patch 2    insulin lispro (HUMALOG) 100 UNIT/ML injection vial Inject 3 Units into the skin continuous Indications: .8 units an hour pt adjusts as she eats Pt uses 140 units every 3 - 4 days in insulin pump 6 each 5    atorvastatin (LIPITOR) 10 MG tablet Take one tab Monday,wenesday,friday 12 tablet 3    Continuous Blood Gluc Sensor (GUARDIAN SENSOR 3) MISC Change every 4 days 5 each 5    lisinopril (PRINIVIL;ZESTRIL) 20 MG tablet Take 1 tablet by mouth daily 90 tablet 3    blood glucose test strips (ACCU-CHEK GUIDE) strip 1 each by In Vitro route 4 times daily As needed.  150 each 11    Accu-Chek FastClix Lancets MISC To test 4x/day 200 each 11 position or movements. HEENT: normocephalic non-traumatic, no exophthalmos   Neck: supple, no LN enlargement, no thyromegaly, no thyroid tenderness, no JVD. Pulm: Clear equal air entry no added sounds, no wheezing or rhonchi    CVS: S1 + S2, no murmur, no heave. Dorsalis pedis pulse palpable   Abd: soft lax, no tenderness, no organomegaly, audible bowel sounds. Skin: warm, no lesions, no rash.  No callus, no Ulcers, No acanthosis nigricans  Musculoskeletal: No back tenderness, no kyphosis/scoliosis    Neuro: CN intact, sensation present bilateral , muscle power normal  Psych: normal mood, and affect    Review of Laboratory Data:  I personally reviewed the following lab:  Lab Results   Component Value Date/Time    WBC 11.6 (H) 05/22/2021 09:50 AM    RBC 4.72 05/22/2021 09:50 AM    HGB 14.5 05/22/2021 09:50 AM    HCT 43.0 05/22/2021 09:50 AM    MCV 91.1 05/22/2021 09:50 AM    MCH 30.7 05/22/2021 09:50 AM    MCHC 33.7 05/22/2021 09:50 AM    RDW 12.7 05/22/2021 09:50 AM     05/22/2021 09:50 AM    MPV 12.9 (H) 05/22/2021 09:50 AM      Lab Results   Component Value Date/Time     04/20/2022 03:55 PM    K 4.5 04/20/2022 03:55 PM    CO2 29 04/20/2022 03:55 PM    BUN 15 04/20/2022 03:55 PM    CREATININE 0.6 04/20/2022 03:55 PM    CALCIUM 9.5 04/20/2022 03:55 PM    LABGLOM >60 04/20/2022 03:55 PM    GFRAA >60 04/20/2022 03:55 PM      Lab Results   Component Value Date/Time    TSH 3.300 11/29/2021 08:03 AM    T4FREE 1.27 08/14/2015 08:46 AM     Lab Results   Component Value Date    LABA1C 7.7 11/29/2021    GLUCOSE 282 04/20/2022    GLUCOSE 233 12/28/2011    MALBCR <30 12/02/2021     Lab Results   Component Value Date    LABA1C 7.7 11/29/2021    LABA1C 8.8 05/22/2021    LABA1C 9.0 07/17/2020     Lab Results   Component Value Date    TRIG 52 11/29/2021    HDL 83 11/29/2021    LDLCALC 83 11/29/2021    CHOL 176 11/29/2021     Lab Results   Component Value Date    VITD25 34 08/14/2015       ASSESSMENT & RECOMMENDATIONS   Flora Olmos, a 60 y.o.-old female seen in for the following issues     Diabetes Mellitus Type 1    · Uncontrolled,   · Will arrange a meeting with our pump  for training on using auto-mode system   · Current pump basal rate 12a 0.55, CR 13, ISF 70, target 100-150, active insulin time 3 hrs  · Discussed with patient A1c and blood sugar goals   · Patient will need routine diabetes maintenance and prevention  · Diabetes labs before next visit     Continuous Glucose Monitoring (CGM) download and interpretation    I personally reviewed and interpreted continuous glucose monitor (CGM) download. CGM report was discussed with patient including blood glucose patterns, percentages of blood glucose at goal, above goal and below goal. Insulin dosages/antidiabetic regimen was adjusted according to CGM download. Full CGM was scanned under media. Hypothyroidism    At goal, Continue levothyroxine 25 mcg daily     I personally reviewed external notes from PCP and other patient's care team providers, and personally interpreted labs associated with the above diagnosis. I also ordered labs to further assess and manage the above addressed medical conditions. Return in about 4 months (around 8/21/2022) for DM type 1, VitD deficiency. The above issues were reviewed with the patient who understood and agreed with the plan. Thank you for allowing us to participate in the care of this patient. Please do not hesitate to contact us with any additional questions. Diagnosis Orders   1. Type 1 diabetes mellitus with hyperglycemia (HCC)  POCT glycosylated hemoglobin (Hb A1C)    insulin lispro (HUMALOG) 100 UNIT/ML SOLN injection vial   2. Hypothyroidism, unspecified type     3. Mixed hyperlipidemia     4.  Vitamin D deficiency       Juleen Dubin MD  Endocrinologist, [facility]   [address] [place] [id number]   Phone: [phone]  Fax: [phone]  --------------------------------------------  An electronic signature was used to authenticate this note.  Magali Strickland MD on 4/21/2022 at 1:44 PM

## 2022-04-22 ENCOUNTER — TELEPHONE (OUTPATIENT)
Dept: ENDOCRINOLOGY | Age: 62
End: 2022-04-22

## 2022-04-23 LAB — C-PEPTIDE: <0.1 NG/ML (ref 0.5–3.3)

## 2022-05-16 DIAGNOSIS — E03.9 ACQUIRED HYPOTHYROIDISM: ICD-10-CM

## 2022-05-16 RX ORDER — LEVOTHYROXINE SODIUM 0.03 MG/1
25 TABLET ORAL DAILY
Qty: 90 TABLET | Refills: 3 | Status: SHIPPED | OUTPATIENT
Start: 2022-05-16

## 2022-05-16 RX ORDER — ATORVASTATIN CALCIUM 10 MG/1
TABLET, FILM COATED ORAL
Qty: 12 TABLET | Refills: 3 | Status: SHIPPED
Start: 2022-05-16 | End: 2022-08-29 | Stop reason: SDUPTHER

## 2022-05-31 DIAGNOSIS — I10 ESSENTIAL HYPERTENSION: ICD-10-CM

## 2022-05-31 RX ORDER — AMLODIPINE BESYLATE 5 MG/1
5 TABLET ORAL DAILY
Qty: 90 TABLET | Refills: 3 | Status: SHIPPED | OUTPATIENT
Start: 2022-05-31 | End: 2022-08-29

## 2022-06-06 LAB — MAMMOGRAPHY, EXTERNAL: NORMAL

## 2022-06-08 LAB — PAP SMEAR, EXTERNAL: NEGATIVE

## 2022-06-09 ENCOUNTER — OFFICE VISIT (OUTPATIENT)
Dept: PRIMARY CARE CLINIC | Age: 62
End: 2022-06-09
Payer: COMMERCIAL

## 2022-06-09 VITALS
SYSTOLIC BLOOD PRESSURE: 130 MMHG | WEIGHT: 159 LBS | BODY MASS INDEX: 24.1 KG/M2 | DIASTOLIC BLOOD PRESSURE: 70 MMHG | HEART RATE: 85 BPM | RESPIRATION RATE: 16 BRPM | OXYGEN SATURATION: 99 % | TEMPERATURE: 97.2 F | HEIGHT: 68 IN

## 2022-06-09 DIAGNOSIS — R42 DIZZINESS: ICD-10-CM

## 2022-06-09 DIAGNOSIS — I10 ESSENTIAL HYPERTENSION: ICD-10-CM

## 2022-06-09 DIAGNOSIS — E03.9 ACQUIRED HYPOTHYROIDISM: Primary | ICD-10-CM

## 2022-06-09 DIAGNOSIS — E55.9 VITAMIN D DEFICIENCY: Primary | ICD-10-CM

## 2022-06-09 DIAGNOSIS — I20.9 ANGINAL PAIN (HCC): ICD-10-CM

## 2022-06-09 DIAGNOSIS — E78.2 MIXED HYPERLIPIDEMIA: ICD-10-CM

## 2022-06-09 PROCEDURE — 93000 ELECTROCARDIOGRAM COMPLETE: CPT | Performed by: INTERNAL MEDICINE

## 2022-06-09 PROCEDURE — 99396 PREV VISIT EST AGE 40-64: CPT | Performed by: INTERNAL MEDICINE

## 2022-06-09 RX ORDER — ERGOCALCIFEROL 1.25 MG/1
50000 CAPSULE ORAL WEEKLY
Qty: 12 CAPSULE | Refills: 3 | Status: SHIPPED | OUTPATIENT
Start: 2022-06-09

## 2022-06-09 SDOH — ECONOMIC STABILITY: FOOD INSECURITY: WITHIN THE PAST 12 MONTHS, YOU WORRIED THAT YOUR FOOD WOULD RUN OUT BEFORE YOU GOT MONEY TO BUY MORE.: NEVER TRUE

## 2022-06-09 SDOH — ECONOMIC STABILITY: FOOD INSECURITY: WITHIN THE PAST 12 MONTHS, THE FOOD YOU BOUGHT JUST DIDN'T LAST AND YOU DIDN'T HAVE MONEY TO GET MORE.: NEVER TRUE

## 2022-06-09 ASSESSMENT — PATIENT HEALTH QUESTIONNAIRE - PHQ9
SUM OF ALL RESPONSES TO PHQ QUESTIONS 1-9: 0
1. LITTLE INTEREST OR PLEASURE IN DOING THINGS: 0
SUM OF ALL RESPONSES TO PHQ QUESTIONS 1-9: 0
SUM OF ALL RESPONSES TO PHQ9 QUESTIONS 1 & 2: 0
2. FEELING DOWN, DEPRESSED OR HOPELESS: 0

## 2022-06-09 ASSESSMENT — SOCIAL DETERMINANTS OF HEALTH (SDOH): HOW HARD IS IT FOR YOU TO PAY FOR THE VERY BASICS LIKE FOOD, HOUSING, MEDICAL CARE, AND HEATING?: NOT HARD AT ALL

## 2022-06-20 ENCOUNTER — TELEPHONE (OUTPATIENT)
Dept: CARDIOLOGY | Age: 62
End: 2022-06-20

## 2022-06-24 NOTE — TELEPHONE ENCOUNTER
Pt need pump and diabetic supplies. Called pt back and asked her to have [employer]  fax us prescription form so I can send it back.

## 2022-06-27 ENCOUNTER — TELEPHONE (OUTPATIENT)
Dept: CARDIOLOGY | Age: 62
End: 2022-06-27

## 2022-06-27 NOTE — TELEPHONE ENCOUNTER
Spoke with patient and confirmed regular stress test appointment, instructions for test reviewed, questions answered.

## 2022-06-28 DIAGNOSIS — E10.65 TYPE 1 DIABETES MELLITUS WITH HYPERGLYCEMIA (HCC): ICD-10-CM

## 2022-06-28 RX ORDER — BLOOD SUGAR DIAGNOSTIC
STRIP MISCELLANEOUS
Qty: 400 EACH | Refills: 5 | Status: SHIPPED | OUTPATIENT
Start: 2022-06-28

## 2022-06-28 RX ORDER — INSULIN PUMP SYRINGE, 3 ML
EACH MISCELLANEOUS
Qty: 10 EACH | Refills: 5 | Status: SHIPPED
Start: 2022-06-28 | End: 2022-07-13 | Stop reason: CLARIF

## 2022-06-29 ENCOUNTER — HOSPITAL ENCOUNTER (OUTPATIENT)
Dept: CARDIOLOGY | Age: 62
Discharge: HOME | End: 2022-06-29
Payer: COMMERCIAL

## 2022-06-29 VITALS
WEIGHT: 159 LBS | HEART RATE: 79 BPM | HEIGHT: 68 IN | RESPIRATION RATE: 16 BRPM | SYSTOLIC BLOOD PRESSURE: 132 MMHG | DIASTOLIC BLOOD PRESSURE: 66 MMHG | BODY MASS INDEX: 24.1 KG/M2

## 2022-06-29 DIAGNOSIS — I20.9 ANGINAL PAIN (HCC): ICD-10-CM

## 2022-06-29 PROCEDURE — 93017 CV STRESS TEST TRACING ONLY: CPT

## 2022-06-30 DIAGNOSIS — R42 DIZZINESS: Primary | ICD-10-CM

## 2022-07-08 ENCOUNTER — HOSPITAL ENCOUNTER (OUTPATIENT)
Dept: ULTRASOUND IMAGING | Age: 62
Discharge: HOME | End: 2022-07-10
Payer: COMMERCIAL

## 2022-07-08 DIAGNOSIS — R42 DIZZINESS: ICD-10-CM

## 2022-07-08 PROCEDURE — 93880 EXTRACRANIAL BILAT STUDY: CPT

## 2022-07-13 ENCOUNTER — TELEPHONE (OUTPATIENT)
Dept: ENDOCRINOLOGY | Age: 62
End: 2022-07-13

## 2022-07-27 ENCOUNTER — HOSPITAL ENCOUNTER (OUTPATIENT)
Age: 62
Discharge: HOME | End: 2022-07-27
Payer: COMMERCIAL

## 2022-07-27 DIAGNOSIS — E78.2 MIXED HYPERLIPIDEMIA: ICD-10-CM

## 2022-07-27 DIAGNOSIS — E03.9 ACQUIRED HYPOTHYROIDISM: ICD-10-CM

## 2022-07-27 DIAGNOSIS — I10 ESSENTIAL HYPERTENSION: ICD-10-CM

## 2022-07-27 DIAGNOSIS — E55.9 VITAMIN D DEFICIENCY: ICD-10-CM

## 2022-07-27 DIAGNOSIS — E10.9 TYPE 1 DIABETES MELLITUS WITHOUT COMPLICATION (HCC): ICD-10-CM

## 2022-07-27 DIAGNOSIS — E03.9 ACQUIRED HYPOTHYROIDISM: Primary | ICD-10-CM

## 2022-07-27 LAB
ALBUMIN SERPL-MCNC: 4.4 G/DL (ref 3.5–5.2)
ALP BLD-CCNC: 72 U/L (ref 35–104)
ALT SERPL-CCNC: 15 U/L (ref 0–32)
ANION GAP SERPL CALCULATED.3IONS-SCNC: 10 MMOL/L (ref 7–16)
AST SERPL-CCNC: 17 U/L (ref 0–31)
BASOPHILS ABSOLUTE: 0.08 E9/L (ref 0–0.2)
BASOPHILS RELATIVE PERCENT: 1.1 % (ref 0–2)
BILIRUB SERPL-MCNC: 0.6 MG/DL (ref 0–1.2)
BUN BLDV-MCNC: 13 MG/DL (ref 6–23)
CALCIUM SERPL-MCNC: 9.6 MG/DL (ref 8.6–10.2)
CHLORIDE BLD-SCNC: 104 MMOL/L (ref 98–107)
CHOLESTEROL, TOTAL: 172 MG/DL (ref 0–199)
CO2: 26 MMOL/L (ref 22–29)
CREAT SERPL-MCNC: 0.7 MG/DL (ref 0.5–1)
EOSINOPHILS ABSOLUTE: 0.15 E9/L (ref 0.05–0.5)
EOSINOPHILS RELATIVE PERCENT: 2.1 % (ref 0–6)
GFR AFRICAN AMERICAN: >60
GFR NON-AFRICAN AMERICAN: >60 ML/MIN/1.73
GLUCOSE BLD-MCNC: 163 MG/DL (ref 74–99)
HCT VFR BLD CALC: 43.1 % (ref 34–48)
HDLC SERPL-MCNC: 89 MG/DL
HEMOGLOBIN: 14.5 G/DL (ref 11.5–15.5)
IMMATURE GRANULOCYTES #: 0.02 E9/L
IMMATURE GRANULOCYTES %: 0.3 % (ref 0–5)
LDL CHOLESTEROL CALCULATED: 73 MG/DL (ref 0–99)
LYMPHOCYTES ABSOLUTE: 1.89 E9/L (ref 1.5–4)
LYMPHOCYTES RELATIVE PERCENT: 26.8 % (ref 20–42)
MCH RBC QN AUTO: 31.8 PG (ref 26–35)
MCHC RBC AUTO-ENTMCNC: 33.6 % (ref 32–34.5)
MCV RBC AUTO: 94.5 FL (ref 80–99.9)
MONOCYTES ABSOLUTE: 0.65 E9/L (ref 0.1–0.95)
MONOCYTES RELATIVE PERCENT: 9.2 % (ref 2–12)
NEUTROPHILS ABSOLUTE: 4.27 E9/L (ref 1.8–7.3)
NEUTROPHILS RELATIVE PERCENT: 60.5 % (ref 43–80)
PDW BLD-RTO: 13 FL (ref 11.5–15)
PLATELET # BLD: 183 E9/L (ref 130–450)
PMV BLD AUTO: 12.2 FL (ref 7–12)
POTASSIUM SERPL-SCNC: 4.4 MMOL/L (ref 3.5–5)
RBC # BLD: 4.56 E12/L (ref 3.5–5.5)
SODIUM BLD-SCNC: 140 MMOL/L (ref 132–146)
T4 FREE: 1.25 NG/DL (ref 0.93–1.7)
TOTAL PROTEIN: 7 G/DL (ref 6.4–8.3)
TRIGL SERPL-MCNC: 51 MG/DL (ref 0–149)
TSH SERPL DL<=0.05 MIU/L-ACNC: 2.76 UIU/ML (ref 0.27–4.2)
VLDLC SERPL CALC-MCNC: 10 MG/DL
WBC # BLD: 7.1 E9/L (ref 4.5–11.5)

## 2022-07-27 PROCEDURE — 36415 COLL VENOUS BLD VENIPUNCTURE: CPT

## 2022-07-27 PROCEDURE — 84443 ASSAY THYROID STIM HORMONE: CPT

## 2022-07-27 PROCEDURE — 80061 LIPID PANEL: CPT

## 2022-07-27 PROCEDURE — 85025 COMPLETE CBC W/AUTO DIFF WBC: CPT

## 2022-07-27 PROCEDURE — 84439 ASSAY OF FREE THYROXINE: CPT

## 2022-07-27 PROCEDURE — 80053 COMPREHEN METABOLIC PANEL: CPT

## 2022-08-10 ENCOUNTER — TELEPHONE (OUTPATIENT)
Dept: ENDOCRINOLOGY | Age: 62
End: 2022-08-10

## 2022-08-10 NOTE — TELEPHONE ENCOUNTER
Pt Called and LM stating she has been unable to get sensors and she was told to get the from pharmacy.  I double checked with our [employer] rep and he advised me to tell pt to call [employer] supply dept ([phone]) since she has [employer] commercial.  LM on Pts Vm with number

## 2022-08-23 ENCOUNTER — OFFICE VISIT (OUTPATIENT)
Dept: ENDOCRINOLOGY | Age: 62
End: 2022-08-23
Payer: COMMERCIAL

## 2022-08-23 VITALS — BODY MASS INDEX: 22.36 KG/M2 | HEIGHT: 69 IN | WEIGHT: 151 LBS

## 2022-08-23 DIAGNOSIS — E10.65 TYPE 1 DIABETES MELLITUS WITH HYPERGLYCEMIA (HCC): Primary | ICD-10-CM

## 2022-08-23 DIAGNOSIS — E55.9 VITAMIN D DEFICIENCY: ICD-10-CM

## 2022-08-23 DIAGNOSIS — E03.9 HYPOTHYROIDISM, UNSPECIFIED TYPE: ICD-10-CM

## 2022-08-23 LAB
HBA1C MFR BLD: 7.8 %
VITAMIN D 25-HYDROXY: 53 NG/ML (ref 30–100)

## 2022-08-23 PROCEDURE — 99214 OFFICE O/P EST MOD 30 MIN: CPT | Performed by: NURSE PRACTITIONER

## 2022-08-23 PROCEDURE — 3051F HG A1C>EQUAL 7.0%<8.0%: CPT | Performed by: NURSE PRACTITIONER

## 2022-08-23 PROCEDURE — 95251 CONT GLUC MNTR ANALYSIS I&R: CPT | Performed by: NURSE PRACTITIONER

## 2022-08-23 PROCEDURE — 83036 HEMOGLOBIN GLYCOSYLATED A1C: CPT | Performed by: NURSE PRACTITIONER

## 2022-08-23 NOTE — PROGRESS NOTES
[address] [place] [address] Department of Endocrinology Diabetes and Metabolism   [address] [facility] [address]   Phone: [phone]  Fax: [phone]    Date of Service: 8/23/2022  Primary Care Physician: Roldan Boston MD  Provider: [address] [place] [address], MYRTLE - NP      Reason for the visit:  DM type 1     History of Present Illness: The history is provided by the patient. No  was used. Accuracy of the patient data is excellent. Declan Palencia is a very pleasant 60 y.o. female seen today for diabetes management     Declan Palencia was diagnosed with diabetes at age 27  and currently on Medtronic insulin pump    Current  settings basal rate 12a 0.65, CR 11, ISF 60, target 100-150, active insulin time 3 hrs    TIR 77  %  AVF     Most recent A1c results summarized below  Lab Results   Component Value Date/Time    LABA1C 7.8 08/23/2022 03:59 PM    LABA1C 8.7 04/21/2022 02:27 PM    LABA1C 7.7 11/29/2021 08:03 AM     The patient has been mindful of what has been eating and following diabetic diet as encouraged  I reviewed current medications and the patient has no issues with diabetes medications  Declan Palencia is up to date with eye exam and denied any history of diabetic retinopathy   The patient performs her own feet care  Microvascular complications:  No Retinopathy, Nephropathy or Neuropathy   Macrovascular complications: no CAD, PVD, or Stroke  The patient receives Flushot every year    On Levothyroxine 25 mcg daily. Patient takes levothyroxine in the morning at empty stomach, wait one hour before eating , avoid multivitamins containing calcium  or iron with it.   Lab Results   Component Value Date/Time    TSH 2.760 07/27/2022 08:17 AM    T4FREE 1.25 07/27/2022 08:17 AM         PAST MEDICAL HISTORY   Past Medical History:   Diagnosis Date    Diabetes mellitus ([address]     INSULIN PUMP    Headache(784.0)     Hyperlipidemia     Hypertension     Hypothyroidism Osteoarthritis     wrists    Thyroid disease     Type 2 diabetes mellitus without complication (HCC)     Type II or unspecified type diabetes mellitus without mention of complication, not stated as uncontrolled     unsure of type    Vitamin D deficiency        PAST SURGICAL HISTORY   Past Surgical History:   Procedure Laterality Date    COLONOSCOPY  03/26/2019    EYE SURGERY      LASIX    NECK SURGERY      TUBAL LIGATION      WRIST FRACTURE SURGERY      I&D right distal radius, with ex-fix       SOCIAL HISTORY   Tobacco:   reports that she has been smoking cigarettes. She has a 5.00 pack-year smoking history. She has never used smokeless tobacco.  Alcohol:   reports current alcohol use of about 1.7 standard drinks per week. Drugs:   reports no history of drug use.     FAMILY HISTORY   Family History   Problem Relation Age of Onset    Dementia Mother     Heart Disease Father     Liver Cancer Sister        ALLERGIES AND DRUG REACTIONS   Allergies   Allergen Reactions    Pcn [Penicillins]     Sulfa Antibiotics        CURRENT MEDICATIONS   Current Outpatient Medications   Medication Sig Dispense Refill    blood glucose test strips (ACCU-CHEK GUIDE) strip Use to check blood glucose 4x daily 400 each 5    vitamin D (ERGOCALCIFEROL) 1.25 MG (73306 UT) CAPS capsule Take 1 capsule by mouth once a week 12 capsule 3    levothyroxine (SYNTHROID) 25 MCG tablet Take 1 tablet by mouth Daily 90 tablet 3    insulin lispro (HUMALOG) 100 UNIT/ML SOLN injection vial 100 units daily via insulin pump 30 mL 5    lisinopril (PRINIVIL;ZESTRIL) 20 MG tablet Take 1 tablet by mouth daily 90 tablet 3    Accu-Chek FastClix Lancets MISC To test 4x/day 200 each 11    Prenatal Ca Carb-B6-B12-FA (COMBI RX PO) 0.05-0.14 mg (Patient not taking: Reported on 6/29/2022)      amLODIPine (NORVASC) 5 MG tablet Take 1 tablet by mouth daily 90 tablet 3    atorvastatin (LIPITOR) 10 MG tablet Take one tab Monday,wenesday,friday 12 tablet 3 estradiol-norethindrone (COMBIPATCH) 0.05-0.14 MG/DAY Place 1 patch onto the skin twice a week. 8 patch 2    insulin lispro (HUMALOG) 100 UNIT/ML injection vial Inject 3 Units into the skin continuous Indications: .8 units an hour pt adjusts as she eats Pt uses 140 units every 3 - 4 days in insulin pump (Patient not taking: Reported on 6/29/2022) 6 each 5    Continuous Blood Gluc Sensor (GUARDIAN SENSOR 3) MISC Change every 4 days 5 each 5    Calcium Carbonate (CALCIUM 500 PO) Take by mouth      LANCETS by Does not apply route. Pt tests 3 times daily      Ascorbic Acid (VITAMIN C) 500 MG tablet Take 500 mg by mouth daily. Cyanocobalamin (VITAMIN B-12 CR PO) Take 1 tablet by mouth daily. VITAMIN E 400 mg by Does not apply route daily. No current facility-administered medications for this visit. Review of Systems  Constitutional: No fever, no chills, no diaphoresis, no generalized weakness. HEENT: No blurred vision, No sore throat, no ear pain, no hair loss  Neck: denied any neck swelling, difficulty swallowing,   Cardio-pulmonary: No CP, SOB or palpitation, No orthopnea or PND. No cough or wheezing. GI: No N/V/D, no constipation, No abdominal pain, no melena or hematochezia   : Denied any dysuria, hematuria, flank pain, discharge, or incontinence. Skin: denied any rash, ulcer, Hirsute, or hyperpigmentation. MSK: denied any joint deformity, joint pain/swelling, muscle pain, or back pain.   Neuro: no numbness, no tingling, no weakness    OBJECTIVE    Ht 5' 9\" (1.753 m)   Wt 151 lb (68.5 kg)   BMI 22.30 kg/m²   BP Readings from Last 4 Encounters:   06/29/22 132/66   06/09/22 130/70   04/21/22 (!) 147/71   12/20/21 (!) 149/71     Wt Readings from Last 6 Encounters:   08/23/22 151 lb (68.5 kg)   06/29/22 159 lb (72.1 kg)   06/09/22 159 lb (72.1 kg)   04/21/22 160 lb 3.2 oz (72.7 kg)   12/20/21 162 lb (73.5 kg)   12/02/21 163 lb (73.9 kg)     Physical examination:  General: awake alert, oriented x3, no abnormal position or movements. HEENT: normocephalic non-traumatic, no exophthalmos   Neck: supple, no LN enlargement, no thyromegaly, no thyroid tenderness, no JVD. Pulm: Clear equal air entry no added sounds, no wheezing or rhonchi    CVS: S1 + S2, no murmur, no heave. Dorsalis pedis pulse palpable   Abd: soft lax, no tenderness, no organomegaly, audible bowel sounds. Skin: warm, no lesions, no rash.  No callus, no Ulcers, No acanthosis nigricans  Musculoskeletal: No back tenderness, no kyphosis/scoliosis    Neuro: CN intact, sensation present bilateral , muscle power normal  Psych: normal mood, and affect    Review of Laboratory Data:  I personally reviewed the following lab:  Lab Results   Component Value Date/Time    WBC 7.1 07/27/2022 08:17 AM    RBC 4.56 07/27/2022 08:17 AM    HGB 14.5 07/27/2022 08:17 AM    HCT 43.1 07/27/2022 08:17 AM    MCV 94.5 07/27/2022 08:17 AM    MCH 31.8 07/27/2022 08:17 AM    MCHC 33.6 07/27/2022 08:17 AM    RDW 13.0 07/27/2022 08:17 AM     07/27/2022 08:17 AM    MPV 12.2 (H) 07/27/2022 08:17 AM      Lab Results   Component Value Date/Time     07/27/2022 08:17 AM    K 4.4 07/27/2022 08:17 AM    CO2 26 07/27/2022 08:17 AM    BUN 13 07/27/2022 08:17 AM    CREATININE 0.7 07/27/2022 08:17 AM    CALCIUM 9.6 07/27/2022 08:17 AM    LABGLOM >60 07/27/2022 08:17 AM    GFRAA >60 07/27/2022 08:17 AM      Lab Results   Component Value Date/Time    TSH 2.760 07/27/2022 08:17 AM    T4FREE 1.25 07/27/2022 08:17 AM     Lab Results   Component Value Date/Time    LABA1C 7.8 08/23/2022 03:59 PM    GLUCOSE 163 07/27/2022 08:17 AM    GLUCOSE 233 12/28/2011 12:10 AM    MALBCR <30 12/02/2021 03:32 PM     Lab Results   Component Value Date/Time    LABA1C 7.8 08/23/2022 03:59 PM    LABA1C 8.7 04/21/2022 02:27 PM    LABA1C 7.7 11/29/2021 08:03 AM     Lab Results   Component Value Date/Time    TRIG 51 07/27/2022 08:17 AM    HDL 89 07/27/2022 08:17 AM    LDLCALC 73 07/27/2022 08:17 AM    CHOL 172 07/27/2022 08:17 AM     Lab Results   Component Value Date/Time    VITD25 34 08/14/2015 08:46 AM       ASSESSMENT & RECOMMENDATIONS   Michele Torres, a 60 y.o.-old female seen in for the following issues     Diabetes Mellitus Type 1    A1c improved 7.8%  Hyperglycemia noted at 10 pm  Current pump basal rate 12a 0.65, CR 11, add 5pm 10, ISF 58, target 100-150, active insulin time 3 hrs  Discussed with patient A1c and blood sugar goals   Patient will need routine diabetes maintenance and prevention  Diabetes labs before next visit     Continuous Glucose Monitoring (CGM) download and interpretation   I personally reviewed and interpreted continuous glucose monitor (CGM) download. CGM report was discussed with patient including blood glucose patterns, percentages of blood glucose at goal, above goal and below goal. Insulin dosages/antidiabetic regimen was adjusted according to CGM download. Full CGM was scanned under media. Hypothyroidism   At goal, Continue levothyroxine 25 mcg daily   Patient takes levothyroxine in the morning at empty stomach, wait one hour before eating , avoid multivitamins containing calcium  or iron with it     Vitamin D  Will check level    I personally reviewed external notes from PCP and other patient's care team providers, and personally interpreted labs associated with the above diagnosis. I also ordered labs to further assess and manage the above addressed medical conditions. Return in about 4 months (around 12/23/2022) for Type 1 DM . The above issues were reviewed with the patient who understood and agreed with the plan. Thank you for allowing us to participate in the care of this patient. Please do not hesitate to contact us with any additional questions. Diagnosis Orders   1. Type 1 diabetes mellitus with hyperglycemia (HCC)  POCT glycosylated hemoglobin (Hb A1C)    [place] CONTINUOUS GLUCOSE MONITORING ANALYSIS I&R      2.  Hypothyroidism, unspecified type        3. Vitamin D deficiency  Vitamin D 25 Hydroxy          MYRTLE Plasencia NP [facility] Diabetes Care and Endocrinology   [address] [facility] [address]   Phone: [phone]  Fax: [phone]  --------------------------------------------  An electronic signature was used to authenticate this note.  [address], APRN - NP on 8/23/2022 at 4:02 PM

## 2022-08-24 ENCOUNTER — TELEPHONE (OUTPATIENT)
Dept: ENDOCRINOLOGY | Age: 62
End: 2022-08-24

## 2022-08-24 NOTE — TELEPHONE ENCOUNTER
----- Message from MYRTLE Parker NP sent at 8/24/2022  7:38 AM EDT -----  Please call pt and inform her that her Vit D is at goal

## 2022-08-29 RX ORDER — ATORVASTATIN CALCIUM 10 MG/1
TABLET, FILM COATED ORAL
Qty: 12 TABLET | Refills: 3 | Status: SHIPPED | OUTPATIENT
Start: 2022-08-29

## 2022-10-25 DIAGNOSIS — Z78.0 MENOPAUSE: ICD-10-CM

## 2022-10-25 RX ORDER — ESTRADIOL/NORETHINDRONE ACETATE TRANSDERMAL SYSTEM .05; .14 MG/D; MG/D
PATCH, EXTENDED RELEASE TRANSDERMAL
Qty: 8 PATCH | Refills: 2 | Status: SHIPPED | OUTPATIENT
Start: 2022-10-25

## 2022-12-12 RX ORDER — LISINOPRIL 20 MG/1
20 TABLET ORAL DAILY
Qty: 90 TABLET | Refills: 3 | Status: SHIPPED
Start: 2022-12-12 | End: 2022-12-13 | Stop reason: SDUPTHER

## 2022-12-13 RX ORDER — LISINOPRIL 20 MG/1
20 TABLET ORAL DAILY
Qty: 90 TABLET | Refills: 3 | Status: SHIPPED | OUTPATIENT
Start: 2022-12-13

## 2022-12-19 RX ORDER — ATORVASTATIN CALCIUM 10 MG/1
TABLET, FILM COATED ORAL
Qty: 12 TABLET | Refills: 3 | Status: SHIPPED | OUTPATIENT
Start: 2022-12-19

## 2022-12-28 ENCOUNTER — OFFICE VISIT (OUTPATIENT)
Dept: ENDOCRINOLOGY | Age: 62
End: 2022-12-28
Payer: COMMERCIAL

## 2022-12-28 VITALS
BODY MASS INDEX: 22.96 KG/M2 | DIASTOLIC BLOOD PRESSURE: 68 MMHG | SYSTOLIC BLOOD PRESSURE: 145 MMHG | HEART RATE: 74 BPM | WEIGHT: 155 LBS | HEIGHT: 69 IN

## 2022-12-28 DIAGNOSIS — E10.65 TYPE 1 DIABETES MELLITUS WITH HYPERGLYCEMIA (HCC): ICD-10-CM

## 2022-12-28 DIAGNOSIS — E03.9 HYPOTHYROIDISM, UNSPECIFIED TYPE: ICD-10-CM

## 2022-12-28 DIAGNOSIS — E10.65 TYPE 1 DIABETES MELLITUS WITH HYPERGLYCEMIA (HCC): Primary | ICD-10-CM

## 2022-12-28 LAB — HBA1C MFR BLD: 7.7 %

## 2022-12-28 PROCEDURE — 95251 CONT GLUC MNTR ANALYSIS I&R: CPT | Performed by: NURSE PRACTITIONER

## 2022-12-28 PROCEDURE — 3051F HG A1C>EQUAL 7.0%<8.0%: CPT | Performed by: NURSE PRACTITIONER

## 2022-12-28 PROCEDURE — 3078F DIAST BP <80 MM HG: CPT | Performed by: NURSE PRACTITIONER

## 2022-12-28 PROCEDURE — 83036 HEMOGLOBIN GLYCOSYLATED A1C: CPT | Performed by: NURSE PRACTITIONER

## 2022-12-28 PROCEDURE — 99214 OFFICE O/P EST MOD 30 MIN: CPT | Performed by: NURSE PRACTITIONER

## 2022-12-28 PROCEDURE — 3074F SYST BP LT 130 MM HG: CPT | Performed by: NURSE PRACTITIONER

## 2022-12-28 RX ORDER — LANCING DEVICE/LANCETS
KIT MISCELLANEOUS
Qty: 1 KIT | Refills: 0 | Status: SHIPPED | OUTPATIENT
Start: 2022-12-28

## 2022-12-28 RX ORDER — LANCETS
EACH MISCELLANEOUS
Qty: 200 EACH | Refills: 11 | Status: SHIPPED | OUTPATIENT
Start: 2022-12-28

## 2022-12-28 NOTE — PROGRESS NOTES
[address] [facility] Department of Endocrinology Diabetes and Metabolism   [address] [facility] [address]   Phone: [phone]  Fax: [phone]    Date of Service: 12/28/2022  Primary Care Physician: Ashtyn Buck MD  Provider: MYRTLE Grace NP    Reason for the visit:    DM type 1     History of Present Illness: The history is provided by the patient. No  was used. Accuracy of the patient data is excellent. Susan Chand is a very pleasant 60 y.o. female seen today for diabetes management     Susan Chand was diagnosed with diabetes at age 27  and currently on Medtronic insulin pump with Guardian sensor     Current  settings:  basal rate 12a 0.65, CR 11, 5pm 10, ISF 58, target 100-150, active insulin time 3 hrs  TIR 79 %  AVF   Hyperglycemia 20%  Lows 1%  GMI 6.9%  TDD 30.3   Automode  93%      Most recent A1c results summarized below  Lab Results   Component Value Date/Time    LABA1C 7.7 12/28/2022 03:05 PM    LABA1C 7.8 08/23/2022 03:59 PM    LABA1C 8.7 04/21/2022 02:27 PM     She is having hypoglycemia events in the middle of the night   The patient has been mindful of what has been eating and following diabetic diet as encouraged  I reviewed current medications and the patient has no issues with diabetes medications  Susan Chand is up to date with eye exam and denied any history of diabetic retinopathy   The patient performs her own feet care  Microvascular complications:  No Retinopathy, Nephropathy or Neuropathy   Macrovascular complications: no CAD, PVD, or Stroke  The patient receives Flushot every year    On Levothyroxine 25 mcg daily. Patient takes levothyroxine in the morning at empty stomach, wait one hour before eating , avoid multivitamins containing calcium  or iron with it.     Lab Results   Component Value Date/Time    TSH 2.760 07/27/2022 08:17 AM    T4FREE 1.25 07/27/2022 08:17 AM         PAST MEDICAL HISTORY   Past Medical History:   Diagnosis Date    Diabetes mellitus ([facility]     INSULIN PUMP    Headache(784.0)     Hyperlipidemia     Hypertension     Hypothyroidism     Osteoarthritis     wrists    Thyroid disease     Type 2 diabetes mellitus without complication ([facility]     Type II or unspecified type diabetes mellitus without mention of complication, not stated as uncontrolled     unsure of type    Vitamin D deficiency        PAST SURGICAL HISTORY   Past Surgical History:   Procedure Laterality Date    COLONOSCOPY  03/26/2019    EYE SURGERY      LASIX    NECK SURGERY      TUBAL LIGATION      WRIST FRACTURE SURGERY      I&D right distal radius, with ex-fix       SOCIAL HISTORY   Tobacco:   reports that she has been smoking cigarettes. She has a 5.00 pack-year smoking history. She has never used smokeless tobacco.  Alcohol:   reports current alcohol use of about 1.7 standard drinks per week. Drugs:   reports no history of drug use. FAMILY HISTORY   Family History   Problem Relation Age of Onset    Dementia Mother     Heart Disease Father     Liver Cancer Sister        ALLERGIES AND DRUG REACTIONS   Allergies   Allergen Reactions    Pcn [Penicillins]     Sulfa Antibiotics        CURRENT MEDICATIONS   Current Outpatient Medications   Medication Sig Dispense Refill    atorvastatin (LIPITOR) 10 MG tablet Take one tab Monday,wenesday,friday 12 tablet 3    lisinopril (PRINIVIL;ZESTRIL) 20 MG tablet Take 1 tablet by mouth daily 90 tablet 3    blood glucose test strips (ACCU-CHEK GUIDE) strip Use to check blood glucose 4x daily 400 each 5    insulin lispro (HUMALOG) 100 UNIT/ML SOLN injection vial 100 units daily via insulin pump 30 mL 5    Continuous Blood Gluc Sensor (GUARDIAN SENSOR 3) MISC Change every 4 days 5 each 5    Accu-Chek FastClix Lancets MISC To test 4x/day 200 each 11    LANCETS by Does not apply route.  Pt tests 3 times daily      estradiol-norethindrone (COMBIPATCH) 0.05-0.14 MG/DAY Place 1 patch onto the skin twice a week. 8 patch 2    vitamin D (ERGOCALCIFEROL) 1.25 MG (06587 UT) CAPS capsule Take 1 capsule by mouth once a week 12 capsule 3    Prenatal Ca Carb-B6-B12-FA (COMBI RX PO) 0.05-0.14 mg (Patient not taking: Reported on 6/29/2022)      amLODIPine (NORVASC) 5 MG tablet Take 1 tablet by mouth daily 90 tablet 3    levothyroxine (SYNTHROID) 25 MCG tablet Take 1 tablet by mouth Daily 90 tablet 3    Calcium Carbonate (CALCIUM 500 PO) Take by mouth      Ascorbic Acid (VITAMIN C) 500 MG tablet Take 500 mg by mouth daily. Cyanocobalamin (VITAMIN B-12 CR PO) Take 1 tablet by mouth daily. VITAMIN E 400 mg by Does not apply route daily. No current facility-administered medications for this visit. Review of Systems  Constitutional: No fever, no chills, no diaphoresis, no generalized weakness. HEENT: No blurred vision, No sore throat, no ear pain, no hair loss  Neck: denied any neck swelling, difficulty swallowing,   Cardio-pulmonary: No CP, SOB or palpitation, No orthopnea or PND. No cough or wheezing. GI: No N/V/D, no constipation, No abdominal pain, no melena or hematochezia   : Denied any dysuria, hematuria, flank pain, discharge, or incontinence. Skin: denied any rash, ulcer, Hirsute, or hyperpigmentation. MSK: denied any joint deformity, joint pain/swelling, muscle pain, or back pain. Neuro: no numbness, no tingling, no weakness    OBJECTIVE    BP (!) 145/68   Pulse 74   Ht 5' 9\" (1.753 m)   Wt 155 lb (70.3 kg)   BMI 22.89 kg/m²   BP Readings from Last 4 Encounters:   12/28/22 (!) 145/68   06/29/22 132/66   06/09/22 130/70   04/21/22 (!) 147/71     Wt Readings from Last 6 Encounters:   12/28/22 155 lb (70.3 kg)   08/23/22 151 lb (68.5 kg)   06/29/22 159 lb (72.1 kg)   06/09/22 159 lb (72.1 kg)   04/21/22 160 lb 3.2 oz (72.7 kg)   12/20/21 162 lb (73.5 kg)     Physical examination:  General: awake alert, oriented x3, no abnormal position or movements.   HEENT: normocephalic non-traumatic, no exophthalmos   Neck: supple, no LN enlargement, no thyromegaly, no thyroid tenderness, no JVD. Pulm: Clear equal air entry no added sounds, no wheezing or rhonchi    CVS: S1 + S2, no murmur, no heave. Dorsalis pedis pulse palpable   Abd: soft lax, no tenderness, no organomegaly, audible bowel sounds. Skin: warm, no lesions, no rash.  No callus, no Ulcers, No acanthosis nigricans  Musculoskeletal: No back tenderness, no kyphosis/scoliosis    Neuro: CN intact, sensation present bilateral , muscle power normal  Psych: normal mood, and affect    Review of Laboratory Data:  I personally reviewed the following lab:  Lab Results   Component Value Date/Time    WBC 7.1 07/27/2022 08:17 AM    RBC 4.56 07/27/2022 08:17 AM    HGB 14.5 07/27/2022 08:17 AM    HCT 43.1 07/27/2022 08:17 AM    MCV 94.5 07/27/2022 08:17 AM    MCH 31.8 07/27/2022 08:17 AM    MCHC 33.6 07/27/2022 08:17 AM    RDW 13.0 07/27/2022 08:17 AM     07/27/2022 08:17 AM    MPV 12.2 (H) 07/27/2022 08:17 AM      Lab Results   Component Value Date/Time     07/27/2022 08:17 AM    K 4.4 07/27/2022 08:17 AM    CO2 26 07/27/2022 08:17 AM    BUN 13 07/27/2022 08:17 AM    CREATININE 0.7 07/27/2022 08:17 AM    CALCIUM 9.6 07/27/2022 08:17 AM    LABGLOM >60 07/27/2022 08:17 AM    GFRAA >60 07/27/2022 08:17 AM      Lab Results   Component Value Date/Time    TSH 2.760 07/27/2022 08:17 AM    T4FREE 1.25 07/27/2022 08:17 AM     Lab Results   Component Value Date/Time    LABA1C 7.7 12/28/2022 03:05 PM    GLUCOSE 163 07/27/2022 08:17 AM    GLUCOSE 233 12/28/2011 12:10 AM    MALBCR <30 12/02/2021 03:32 PM     Lab Results   Component Value Date/Time    LABA1C 7.7 12/28/2022 03:05 PM    LABA1C 7.8 08/23/2022 03:59 PM    LABA1C 8.7 04/21/2022 02:27 PM     Lab Results   Component Value Date/Time    TRIG 51 07/27/2022 08:17 AM    HDL 89 07/27/2022 08:17 AM    LDLCALC 73 07/27/2022 08:17 AM    CHOL 172 07/27/2022 08:17 AM     Lab Results   Component Value Date/Time [id number] 08/23/2022 04:15 PM    VITD25 34 08/14/2015 08:46 AM       ASSESSMENT & RECOMMENDATIONS   Michele Torres, a 60 y.o.-old female seen in for the following issues     Diabetes Mellitus Type 1    A1c 7.8% -> 7.7%  GMI  6.9%  Hyperglycemia noted after lunch  Current pump basal rate 12a 0.65, CR 11, 5pm 9.5, ISF 58, target 100-150, active insulin time 2:45 hrs  Discussed with patient A1c and blood sugar goals   Patient will need routine diabetes maintenance and prevention  Diabetes labs before next visit     Continuous Glucose Monitoring (CGM) download and interpretation   I personally reviewed and interpreted continuous glucose monitor (CGM) download. CGM report was discussed with patient including blood glucose patterns, percentages of blood glucose at goal, above goal and below goal. Insulin dosages/antidiabetic regimen was adjusted according to CGM download. Full CGM was scanned under media. Hypothyroidism   At goal, Continue levothyroxine 25 mcg daily   Patient takes levothyroxine in the morning at empty stomach, wait one hour before eating , avoid multivitamins containing calcium  or iron with it       I personally reviewed external notes from PCP and other patient's care team providers, and personally interpreted labs associated with the above diagnosis. I also ordered labs to further assess and manage the above addressed medical conditions. Return in about 3 months (around 3/28/2023) for Type 2 DM . The above issues were reviewed with the patient who understood and agreed with the plan. Thank you for allowing us to participate in the care of this patient. Please do not hesitate to contact us with any additional questions. Diagnosis Orders   1. Type 1 diabetes mellitus with hyperglycemia (HCC)  POCT glycosylated hemoglobin (Hb A1C)    NE CONTINUOUS GLUCOSE MONITORING ANALYSIS I&R      2.  Hypothyroidism, unspecified type            MYRTLE Grace - KAROLYN    [facility] and Endocrinology   [address] [facility], [address] [place] [address],[address] [address]   Phone: [phone]  Fax: [phone]  --------------------------------------------  An electronic signature was used to authenticate this note.  [address], MYRTLE - NP on 12/28/2022 at 3:06 PM

## 2023-04-03 ENCOUNTER — OFFICE VISIT (OUTPATIENT)
Dept: ENDOCRINOLOGY | Age: 63
End: 2023-04-03
Payer: COMMERCIAL

## 2023-04-03 VITALS
HEIGHT: 69 IN | BODY MASS INDEX: 23.11 KG/M2 | SYSTOLIC BLOOD PRESSURE: 136 MMHG | WEIGHT: 156 LBS | RESPIRATION RATE: 16 BRPM | HEART RATE: 82 BPM | DIASTOLIC BLOOD PRESSURE: 73 MMHG

## 2023-04-03 DIAGNOSIS — E03.9 ACQUIRED HYPOTHYROIDISM: ICD-10-CM

## 2023-04-03 DIAGNOSIS — E55.9 VITAMIN D DEFICIENCY: ICD-10-CM

## 2023-04-03 DIAGNOSIS — E78.2 MIXED HYPERLIPIDEMIA: ICD-10-CM

## 2023-04-03 DIAGNOSIS — E10.65 TYPE 1 DIABETES MELLITUS WITH HYPERGLYCEMIA (HCC): Primary | ICD-10-CM

## 2023-04-03 LAB — HBA1C MFR BLD: 8 %

## 2023-04-03 PROCEDURE — 3052F HG A1C>EQUAL 8.0%<EQUAL 9.0%: CPT | Performed by: NURSE PRACTITIONER

## 2023-04-03 PROCEDURE — 83036 HEMOGLOBIN GLYCOSYLATED A1C: CPT | Performed by: NURSE PRACTITIONER

## 2023-04-03 PROCEDURE — 3078F DIAST BP <80 MM HG: CPT | Performed by: NURSE PRACTITIONER

## 2023-04-03 PROCEDURE — 95251 CONT GLUC MNTR ANALYSIS I&R: CPT | Performed by: NURSE PRACTITIONER

## 2023-04-03 PROCEDURE — 3075F SYST BP GE 130 - 139MM HG: CPT | Performed by: NURSE PRACTITIONER

## 2023-04-03 PROCEDURE — 99214 OFFICE O/P EST MOD 30 MIN: CPT | Performed by: NURSE PRACTITIONER

## 2023-04-03 NOTE — PROGRESS NOTES
tablet Take 1 tablet by mouth Daily 90 tablet 3    insulin lispro (HUMALOG) 100 UNIT/ML SOLN injection vial 100 units daily via insulin pump 30 mL 5    Continuous Blood Gluc Sensor (GUARDIAN SENSOR 3) MISC Change every 4 days 5 each 5    Calcium Carbonate (CALCIUM 500 PO) Take by mouth      LANCETS by Does not apply route. Pt tests 3 times daily      Ascorbic Acid (VITAMIN C) 500 MG tablet Take 1 tablet by mouth daily      Cyanocobalamin (VITAMIN B-12 CR PO) Take 1 tablet by mouth daily. VITAMIN E 400 mg by Does not apply route daily. Prenatal Ca Carb-B6-B12-FA (COMBI RX PO) 0.05-0.14 mg (Patient not taking: Reported on 6/29/2022)      amLODIPine (NORVASC) 5 MG tablet Take 1 tablet by mouth daily 90 tablet 3     No current facility-administered medications for this visit. Review of Systems  Constitutional: No fever, no chills, no diaphoresis, no generalized weakness. HEENT: No blurred vision, No sore throat, no ear pain, no hair loss  Neck: denied any neck swelling, difficulty swallowing,   Cardio-pulmonary: No CP, SOB or palpitation, No orthopnea or PND. No cough or wheezing. GI: No N/V/D, no constipation, No abdominal pain, no melena or hematochezia   : Denied any dysuria, hematuria, flank pain, discharge, or incontinence. Skin: denied any rash, ulcer, Hirsute, or hyperpigmentation. MSK: denied any joint deformity, joint pain/swelling, muscle pain, or back pain.   Neuro: no numbness, no tingling, no weakness    OBJECTIVE    /73   Pulse 82   Resp 16   Ht 5' 9\" (1.753 m)   Wt 156 lb (70.8 kg)   BMI 23.04 kg/m²   BP Readings from Last 4 Encounters:   04/03/23 136/73   12/28/22 (!) 145/68   06/29/22 132/66   06/09/22 130/70     Wt Readings from Last 6 Encounters:   04/03/23 156 lb (70.8 kg)   12/28/22 155 lb (70.3 kg)   08/23/22 151 lb (68.5 kg)   06/29/22 159 lb (72.1 kg)   06/09/22 159 lb (72.1 kg)   04/21/22 160 lb 3.2 oz (72.7 kg)     Physical examination:  General: awake alert,

## 2023-04-10 DIAGNOSIS — E78.2 MIXED HYPERLIPIDEMIA: Primary | ICD-10-CM

## 2023-04-10 RX ORDER — ATORVASTATIN CALCIUM 10 MG/1
TABLET, FILM COATED ORAL
Qty: 12 TABLET | Refills: 3 | Status: SHIPPED
Start: 2023-04-10 | End: 2023-04-10

## 2023-05-06 DIAGNOSIS — E55.9 VITAMIN D DEFICIENCY: ICD-10-CM

## 2023-05-06 DIAGNOSIS — E03.9 ACQUIRED HYPOTHYROIDISM: ICD-10-CM

## 2023-05-08 RX ORDER — LEVOTHYROXINE SODIUM 0.03 MG/1
TABLET ORAL
Qty: 90 TABLET | Refills: 3 | Status: SHIPPED | OUTPATIENT
Start: 2023-05-08

## 2023-05-08 RX ORDER — ERGOCALCIFEROL 1.25 MG/1
CAPSULE ORAL
Qty: 12 CAPSULE | Refills: 3 | Status: SHIPPED | OUTPATIENT
Start: 2023-05-08

## 2023-05-23 DIAGNOSIS — Z78.0 MENOPAUSE: ICD-10-CM

## 2023-05-23 RX ORDER — ESTRADIOL/NORETHINDRONE ACETATE TRANSDERMAL SYSTEM .05; .14 MG/D; MG/D
PATCH, EXTENDED RELEASE TRANSDERMAL
Qty: 26 PATCH | Refills: 3 | Status: SHIPPED | OUTPATIENT
Start: 2023-05-23

## 2023-05-31 ENCOUNTER — HOSPITAL ENCOUNTER (OUTPATIENT)
Age: 63
Discharge: HOME | End: 2023-05-31
Payer: COMMERCIAL

## 2023-05-31 DIAGNOSIS — E55.9 VITAMIN D DEFICIENCY: ICD-10-CM

## 2023-05-31 DIAGNOSIS — E10.65 TYPE 1 DIABETES MELLITUS WITH HYPERGLYCEMIA (HCC): ICD-10-CM

## 2023-05-31 DIAGNOSIS — E03.9 ACQUIRED HYPOTHYROIDISM: ICD-10-CM

## 2023-05-31 LAB
ANION GAP SERPL CALCULATED.3IONS-SCNC: 16 MMOL/L (ref 7–16)
BUN SERPL-MCNC: 13 MG/DL (ref 6–23)
CALCIUM SERPL-MCNC: 9.3 MG/DL (ref 8.6–10.2)
CHLORIDE SERPL-SCNC: 99 MMOL/L (ref 98–107)
CHOLESTEROL, TOTAL: 168 MG/DL (ref 0–199)
CO2 SERPL-SCNC: 20 MMOL/L (ref 22–29)
CREAT SERPL-MCNC: 0.9 MG/DL (ref 0.5–1)
CREAT UR-MCNC: 158 MG/DL (ref 29–226)
ERYTHROCYTE [DISTWIDTH] IN BLOOD BY AUTOMATED COUNT: 13 FL (ref 11.5–15)
GLUCOSE SERPL-MCNC: 291 MG/DL (ref 74–99)
HCT VFR BLD AUTO: 41.7 % (ref 34–48)
HDLC SERPL-MCNC: 92 MG/DL
HGB BLD-MCNC: 13.9 G/DL (ref 11.5–15.5)
LDLC SERPL CALC-MCNC: 59 MG/DL (ref 0–99)
MCH RBC QN AUTO: 30.9 PG (ref 26–35)
MCHC RBC AUTO-ENTMCNC: 33.3 % (ref 32–34.5)
MCV RBC AUTO: 92.7 FL (ref 80–99.9)
MICROALBUMIN UR-MCNC: 115 MG/L
MICROALBUMIN/CREAT UR-RTO: 72.8 (ref 0–30)
PLATELET # BLD AUTO: 191 E9/L (ref 130–450)
PMV BLD AUTO: 12 FL (ref 7–12)
POTASSIUM SERPL-SCNC: 4.2 MMOL/L (ref 3.5–5)
RBC # BLD AUTO: 4.5 E12/L (ref 3.5–5.5)
SODIUM SERPL-SCNC: 135 MMOL/L (ref 132–146)
T4 FREE SERPL-MCNC: 1.69 NG/DL (ref 0.93–1.7)
TRIGL SERPL-MCNC: 86 MG/DL (ref 0–149)
TSH SERPL-MCNC: 2.03 UIU/ML (ref 0.27–4.2)
VLDLC SERPL CALC-MCNC: 17 MG/DL
WBC # BLD: 11.2 E9/L (ref 4.5–11.5)

## 2023-05-31 PROCEDURE — 84439 ASSAY OF FREE THYROXINE: CPT

## 2023-05-31 PROCEDURE — 82306 VITAMIN D 25 HYDROXY: CPT

## 2023-05-31 PROCEDURE — 84443 ASSAY THYROID STIM HORMONE: CPT

## 2023-05-31 PROCEDURE — 85027 COMPLETE CBC AUTOMATED: CPT

## 2023-05-31 PROCEDURE — 82044 UR ALBUMIN SEMIQUANTITATIVE: CPT

## 2023-05-31 PROCEDURE — 80048 BASIC METABOLIC PNL TOTAL CA: CPT

## 2023-05-31 PROCEDURE — 36415 COLL VENOUS BLD VENIPUNCTURE: CPT

## 2023-05-31 PROCEDURE — 80061 LIPID PANEL: CPT

## 2023-05-31 PROCEDURE — 82570 ASSAY OF URINE CREATININE: CPT

## 2023-06-01 ENCOUNTER — TELEPHONE (OUTPATIENT)
Dept: ENDOCRINOLOGY | Age: 63
End: 2023-06-01

## 2023-06-01 LAB — VITAMIN D 25-HYDROXY: 56 NG/ML (ref 30–100)

## 2023-06-01 NOTE — TELEPHONE ENCOUNTER
Labs reviewed and all at goal except glucose was 291 at the time of the draw and small amount of protein now present in urine, compared to previous. I advise good BS control.  She is on lisinopril which has a renal protective mechanism

## 2023-06-20 DIAGNOSIS — E10.65 TYPE 1 DIABETES MELLITUS WITH HYPERGLYCEMIA (HCC): ICD-10-CM

## 2023-06-20 RX ORDER — INSULIN LISPRO 100 [IU]/ML
INJECTION, SOLUTION INTRAVENOUS; SUBCUTANEOUS
Qty: 90 ML | Refills: 1 | Status: SHIPPED | OUTPATIENT
Start: 2023-06-20

## 2023-07-07 ASSESSMENT — PATIENT HEALTH QUESTIONNAIRE - PHQ9
1. LITTLE INTEREST OR PLEASURE IN DOING THINGS: NOT AT ALL
SUM OF ALL RESPONSES TO PHQ QUESTIONS 1-9: 0
2. FEELING DOWN, DEPRESSED OR HOPELESS: NOT AT ALL
SUM OF ALL RESPONSES TO PHQ9 QUESTIONS 1 & 2: 0
SUM OF ALL RESPONSES TO PHQ QUESTIONS 1-9: 0
SUM OF ALL RESPONSES TO PHQ9 QUESTIONS 1 & 2: 0
1. LITTLE INTEREST OR PLEASURE IN DOING THINGS: 0
2. FEELING DOWN, DEPRESSED OR HOPELESS: 0

## 2023-07-10 ENCOUNTER — OFFICE VISIT (OUTPATIENT)
Dept: PRIMARY CARE CLINIC | Age: 63
End: 2023-07-10
Payer: COMMERCIAL

## 2023-07-10 VITALS
OXYGEN SATURATION: 98 % | WEIGHT: 147 LBS | TEMPERATURE: 98 F | SYSTOLIC BLOOD PRESSURE: 138 MMHG | RESPIRATION RATE: 17 BRPM | HEIGHT: 69 IN | BODY MASS INDEX: 21.77 KG/M2 | HEART RATE: 71 BPM | DIASTOLIC BLOOD PRESSURE: 78 MMHG

## 2023-07-10 DIAGNOSIS — E10.9 TYPE 1 DIABETES MELLITUS WITHOUT COMPLICATION (HCC): ICD-10-CM

## 2023-07-10 DIAGNOSIS — E78.00 PURE HYPERCHOLESTEROLEMIA: ICD-10-CM

## 2023-07-10 DIAGNOSIS — E78.2 MIXED HYPERLIPIDEMIA: ICD-10-CM

## 2023-07-10 DIAGNOSIS — I10 ESSENTIAL HYPERTENSION: Primary | ICD-10-CM

## 2023-07-10 DIAGNOSIS — E03.9 ACQUIRED HYPOTHYROIDISM: ICD-10-CM

## 2023-07-10 PROCEDURE — 99214 OFFICE O/P EST MOD 30 MIN: CPT | Performed by: INTERNAL MEDICINE

## 2023-07-10 PROCEDURE — 3052F HG A1C>EQUAL 8.0%<EQUAL 9.0%: CPT | Performed by: INTERNAL MEDICINE

## 2023-07-10 PROCEDURE — 3074F SYST BP LT 130 MM HG: CPT | Performed by: INTERNAL MEDICINE

## 2023-07-10 PROCEDURE — 3078F DIAST BP <80 MM HG: CPT | Performed by: INTERNAL MEDICINE

## 2023-07-10 SDOH — ECONOMIC STABILITY: FOOD INSECURITY: WITHIN THE PAST 12 MONTHS, THE FOOD YOU BOUGHT JUST DIDN'T LAST AND YOU DIDN'T HAVE MONEY TO GET MORE.: NEVER TRUE

## 2023-07-10 SDOH — ECONOMIC STABILITY: FOOD INSECURITY: WITHIN THE PAST 12 MONTHS, YOU WORRIED THAT YOUR FOOD WOULD RUN OUT BEFORE YOU GOT MONEY TO BUY MORE.: NEVER TRUE

## 2023-07-10 SDOH — ECONOMIC STABILITY: HOUSING INSECURITY
IN THE LAST 12 MONTHS, WAS THERE A TIME WHEN YOU DID NOT HAVE A STEADY PLACE TO SLEEP OR SLEPT IN A SHELTER (INCLUDING NOW)?: NO

## 2023-07-10 SDOH — ECONOMIC STABILITY: INCOME INSECURITY: HOW HARD IS IT FOR YOU TO PAY FOR THE VERY BASICS LIKE FOOD, HOUSING, MEDICAL CARE, AND HEATING?: NOT HARD AT ALL

## 2023-07-27 ENCOUNTER — TELEPHONE (OUTPATIENT)
Dept: ENDOCRINOLOGY | Age: 63
End: 2023-07-27

## 2023-07-27 NOTE — TELEPHONE ENCOUNTER
Pt called office Lvm stating that her GYN wants her to change medications but she is uncomfortable making any med changes without speaking to [facility]. Tried to call pt back and no answer, I left her a vm to call office back to find out what medications the GYN wanted her to switch. Pt called back and stated that GYN wants her to stop taking combipatch .5-.1mg 1 patch a week, would like pt to start Veoah 45mg. Pt would like to know if [facility] thinks this is a good idea or not or even safe.

## 2023-08-04 ENCOUNTER — TELEPHONE (OUTPATIENT)
Dept: ENDOCRINOLOGY | Age: 63
End: 2023-08-04

## 2023-08-04 NOTE — TELEPHONE ENCOUNTER
Spoke with patient ,patients' gynecologist would like to discontinue the [place]. Starting the patient on 45 mg of Memo daily contra indications. Patient is nervious and would like a second opinion if its ok to switch.

## 2023-08-07 RX ORDER — ATORVASTATIN CALCIUM 10 MG/1
TABLET, FILM COATED ORAL
Qty: 12 TABLET | Refills: 0 | Status: SHIPPED | OUTPATIENT
Start: 2023-08-07

## 2023-08-31 RX ORDER — ATORVASTATIN CALCIUM 10 MG/1
TABLET, FILM COATED ORAL
Qty: 12 TABLET | Refills: 0 | Status: SHIPPED | OUTPATIENT
Start: 2023-08-31

## 2023-09-05 ENCOUNTER — OFFICE VISIT (OUTPATIENT)
Dept: ENDOCRINOLOGY | Age: 63
End: 2023-09-05

## 2023-09-05 VITALS
WEIGHT: 140 LBS | BODY MASS INDEX: 20.73 KG/M2 | RESPIRATION RATE: 18 BRPM | DIASTOLIC BLOOD PRESSURE: 69 MMHG | SYSTOLIC BLOOD PRESSURE: 151 MMHG | HEIGHT: 69 IN | HEART RATE: 82 BPM | OXYGEN SATURATION: 99 %

## 2023-09-05 DIAGNOSIS — E78.2 MIXED HYPERLIPIDEMIA: ICD-10-CM

## 2023-09-05 DIAGNOSIS — E03.9 ACQUIRED HYPOTHYROIDISM: ICD-10-CM

## 2023-09-05 DIAGNOSIS — E10.65 TYPE 1 DIABETES MELLITUS WITH HYPERGLYCEMIA (HCC): Primary | ICD-10-CM

## 2023-09-05 DIAGNOSIS — E55.9 VITAMIN D DEFICIENCY: ICD-10-CM

## 2023-09-05 LAB — HBA1C MFR BLD: 8.2 %

## 2023-09-05 NOTE — PROGRESS NOTES
Past Medical History:   Diagnosis Date    Diabetes mellitus ([address])     INSULIN PUMP    Headache(784.0)     Hyperlipidemia     Hypertension     Hypothyroidism     Osteoarthritis     wrists    Thyroid disease     Type 2 diabetes mellitus without complication ([address])     Type II or unspecified type diabetes mellitus without mention of complication, not stated as uncontrolled     unsure of type    Vitamin D deficiency        PAST SURGICAL HISTORY   Past Surgical History:   Procedure Laterality Date    COLONOSCOPY  03/26/2019    CORONARY ARTERY BYPASS GRAFT      EYE SURGERY      LASIX    EYE SURGERY  12/20/1979    JOINT REPLACEMENT  06/01/1999    NECK SURGERY      TUBAL LIGATION      WRIST FRACTURE SURGERY      I&D right distal radius, with ex-fix       SOCIAL HISTORY   Tobacco:   reports that she has been smoking cigarettes. She has a 22.50 pack-year smoking history. She has never used smokeless tobacco.  Alcohol:   reports current alcohol use. Drugs:   reports no history of drug use.     FAMILY HISTORY   Family History   Problem Relation Age of Onset    Dementia Mother     Heart Disease Father     Hearing Loss Father     Stroke Father     Liver Cancer Sister     Asthma Sister     Cancer Sister        ALLERGIES AND DRUG REACTIONS   Allergies   Allergen Reactions    Pcn [Penicillins]     Sulfa Antibiotics        CURRENT MEDICATIONS   Current Outpatient Medications   Medication Sig Dispense Refill    atorvastatin (LIPITOR) 10 MG tablet TAKE 1 TABLET BY MOUTH ON MONDAYS, WEDNESDAYS AND FRIDAYS 12 tablet 0    insulin lispro (HUMALOG) 100 UNIT/ML SOLN injection vial 100 UNITS DAILY VIA INSULIN PUMP 90 mL 1    amLODIPine (NORVASC) 5 MG tablet TAKE ONE TABLET BY MOUTH DAILY 90 tablet 3    COMBIPATCH 0.05-0.14 MG/DAY PLACE 1 PATCH ONTO THE SKIN TWICE A WEEK 26 patch 3    vitamin D (ERGOCALCIFEROL) 1.25 MG (80093 UT) CAPS capsule TAKE ONE CAPSULE BY MOUTH ONCE A WEEK 12 capsule 3    levothyroxine (SYNTHROID) 25 MCG tablet TAKE

## 2023-09-11 RX ORDER — ATORVASTATIN CALCIUM 10 MG/1
TABLET, FILM COATED ORAL
Qty: 12 TABLET | Refills: 0 | Status: SHIPPED | OUTPATIENT
Start: 2023-09-11

## 2023-11-06 RX ORDER — ATORVASTATIN CALCIUM 10 MG/1
TABLET, FILM COATED ORAL
Qty: 12 TABLET | Refills: 0 | Status: SHIPPED | OUTPATIENT
Start: 2023-11-06

## 2023-11-27 RX ORDER — ATORVASTATIN CALCIUM 10 MG/1
TABLET, FILM COATED ORAL
Qty: 12 TABLET | Refills: 0 | Status: SHIPPED | OUTPATIENT
Start: 2023-11-27

## 2023-11-29 RX ORDER — LISINOPRIL 20 MG/1
20 TABLET ORAL DAILY
Qty: 90 TABLET | Refills: 0 | Status: SHIPPED | OUTPATIENT
Start: 2023-11-29

## 2023-12-06 RX ORDER — ATORVASTATIN CALCIUM 10 MG/1
TABLET, FILM COATED ORAL
Qty: 12 TABLET | Refills: 0 | Status: SHIPPED | OUTPATIENT
Start: 2023-12-06

## 2024-01-08 ENCOUNTER — OFFICE VISIT (OUTPATIENT)
Dept: ENDOCRINOLOGY | Age: 64
End: 2024-01-08
Payer: COMMERCIAL

## 2024-01-08 VITALS
HEART RATE: 68 BPM | DIASTOLIC BLOOD PRESSURE: 75 MMHG | WEIGHT: 141.4 LBS | OXYGEN SATURATION: 100 % | HEIGHT: 68 IN | SYSTOLIC BLOOD PRESSURE: 142 MMHG | RESPIRATION RATE: 18 BRPM | BODY MASS INDEX: 21.43 KG/M2

## 2024-01-08 DIAGNOSIS — E10.65 TYPE 1 DIABETES MELLITUS WITH HYPERGLYCEMIA (HCC): Primary | ICD-10-CM

## 2024-01-08 DIAGNOSIS — E03.9 ACQUIRED HYPOTHYROIDISM: ICD-10-CM

## 2024-01-08 DIAGNOSIS — E78.2 MIXED HYPERLIPIDEMIA: ICD-10-CM

## 2024-01-08 DIAGNOSIS — E55.9 VITAMIN D DEFICIENCY: ICD-10-CM

## 2024-01-08 PROCEDURE — 3078F DIAST BP <80 MM HG: CPT | Performed by: NURSE PRACTITIONER

## 2024-01-08 PROCEDURE — 99214 OFFICE O/P EST MOD 30 MIN: CPT | Performed by: NURSE PRACTITIONER

## 2024-01-08 PROCEDURE — 3077F SYST BP >= 140 MM HG: CPT | Performed by: NURSE PRACTITIONER

## 2024-01-08 PROCEDURE — 83036 HEMOGLOBIN GLYCOSYLATED A1C: CPT | Performed by: NURSE PRACTITIONER

## 2024-01-08 RX ORDER — INSULIN LISPRO 100 [IU]/ML
INJECTION, SOLUTION INTRAVENOUS; SUBCUTANEOUS
Qty: 90 ML | Refills: 1 | Status: SHIPPED | OUTPATIENT
Start: 2024-01-08

## 2024-01-08 NOTE — PROGRESS NOTES
[facility] [email or web address]  [facility] Department of Endocrinology Diabetes and Metabolism   [address], [address], [place] [id number]  Phone: [phone]  Fax: [phone]    Date of Service: 1/8/2024  Primary Care Physician: Alec Ybarra MD  Provider: MYRTLE Plasencia NP    Reason for the visit:    DM type 1     History of Present Illness:  The history is provided by the patient. No  was used. Accuracy of the patient data is excellent.  Michele Torres is a very pleasant 63 y.o. female seen today for diabetes management     Michele Torres was diagnosed with diabetes at age 34  and currently on Medtronic 770g insulin pump with Guardian sensor     Current  settings:    Basal rate 12a 0.65, CR 9.5, 5pm 8.0, ISF 56, target 100-150, AIT  2:00 hrs    TIR 76 %  AVF   Hyperglycemia 21%  Lows 2%  TDD 33.7  Automode 56 %    Reports ongoing stress   Has been rationing supplies    Most recent A1c results summarized below  Lab Results   Component Value Date/Time    LABA1C 8.2 09/05/2023 03:49 PM    LABA1C 8.0 04/03/2023 03:00 PM    LABA1C 7.7 12/28/2022 03:05 PM     She is having hypoglycemia events in the middle of the day   The patient has been mindful of what has been eating and following diabetic diet as encouraged  I reviewed current medications and the patient has no issues with diabetes medications  Michele Torres is up to date with eye exam and denied any history of diabetic retinopathy   The patient performs her own feet care  Microvascular complications:  No Retinopathy, Nephropathy or Neuropathy   Macrovascular complications: no CAD, PVD, or Stroke  The patient receives Flushot every year    On Levothyroxine 25 mcg daily.   Patient takes levothyroxine in the morning at empty stomach, wait one hour before eating , avoid multivitamins containing calcium  or iron with it.    Lab Results   Component Value Date/Time    TSH 2.030 05/31/2023 09:24 AM    T4FREE 1.69 05/31/2023

## 2024-01-18 ENCOUNTER — OFFICE VISIT (OUTPATIENT)
Dept: PRIMARY CARE CLINIC | Age: 64
End: 2024-01-18
Payer: COMMERCIAL

## 2024-01-18 VITALS
WEIGHT: 140 LBS | DIASTOLIC BLOOD PRESSURE: 70 MMHG | HEART RATE: 85 BPM | HEIGHT: 68 IN | BODY MASS INDEX: 21.22 KG/M2 | TEMPERATURE: 97.7 F | RESPIRATION RATE: 16 BRPM | OXYGEN SATURATION: 99 % | SYSTOLIC BLOOD PRESSURE: 138 MMHG

## 2024-01-18 DIAGNOSIS — E10.9 TYPE 1 DIABETES MELLITUS WITHOUT COMPLICATION (HCC): ICD-10-CM

## 2024-01-18 DIAGNOSIS — E03.9 ACQUIRED HYPOTHYROIDISM: ICD-10-CM

## 2024-01-18 DIAGNOSIS — I10 ESSENTIAL HYPERTENSION: Primary | ICD-10-CM

## 2024-01-18 PROCEDURE — 99213 OFFICE O/P EST LOW 20 MIN: CPT | Performed by: INTERNAL MEDICINE

## 2024-01-18 PROCEDURE — 3078F DIAST BP <80 MM HG: CPT | Performed by: INTERNAL MEDICINE

## 2024-01-18 PROCEDURE — 3075F SYST BP GE 130 - 139MM HG: CPT | Performed by: INTERNAL MEDICINE

## 2024-01-18 ASSESSMENT — PATIENT HEALTH QUESTIONNAIRE - PHQ9
SUM OF ALL RESPONSES TO PHQ QUESTIONS 1-9: 0
1. LITTLE INTEREST OR PLEASURE IN DOING THINGS: 0
2. FEELING DOWN, DEPRESSED OR HOPELESS: 0
SUM OF ALL RESPONSES TO PHQ QUESTIONS 1-9: 0
SUM OF ALL RESPONSES TO PHQ9 QUESTIONS 1 & 2: 0
SUM OF ALL RESPONSES TO PHQ QUESTIONS 1-9: 0
SUM OF ALL RESPONSES TO PHQ QUESTIONS 1-9: 0

## 2024-01-18 NOTE — PROGRESS NOTES
Ani Torres  1/18/24     Chief Complaint   Patient presents with    Diabetes        Allergies   Allergen Reactions    Pcn [Penicillins]     Sulfa Antibiotics         Current Outpatient Medications   Medication Sig Dispense Refill    insulin lispro (HUMALOG) 100 UNIT/ML SOLN injection vial 100 UNITS DAILY VIA INSULIN PUMP 90 mL 1    lisinopril (PRINIVIL;ZESTRIL) 20 MG tablet TAKE ONE TABLET BY MOUTH DAILY 90 tablet 0    amLODIPine (NORVASC) 5 MG tablet TAKE ONE TABLET BY MOUTH DAILY 90 tablet 3    COMBIPATCH 0.05-0.14 MG/DAY PLACE 1 PATCH ONTO THE SKIN TWICE A WEEK 26 patch 3    vitamin D (ERGOCALCIFEROL) 1.25 MG (79016 UT) CAPS capsule TAKE ONE CAPSULE BY MOUTH ONCE A WEEK 12 capsule 3    levothyroxine (SYNTHROID) 25 MCG tablet TAKE ONE TABLET BY MOUTH DAILY 90 tablet 3    Continuous Blood Gluc Sensor (GUARDIAN SENSOR 3) MISC Change every 4 days 5 each 3    Accu-Chek FastClix Lancets MISC To test 4x/day 200 each 11    Lancets Misc. (ACCU-CHEK FASTCLIX LANCET) KIT Use to check blood glucose 1 kit 0    blood glucose test strips (ACCU-CHEK GUIDE) strip Use to check blood glucose 4x daily 400 each 5    Prenatal Ca Carb-B6-B12-FA (COMBI RX PO) 0.05-0.14 mg      Calcium Carbonate (CALCIUM 500 PO) Take by mouth      LANCETS by Does not apply route. Pt tests 3 times daily      Ascorbic Acid (VITAMIN C) 500 MG tablet Take 1 tablet by mouth daily      Cyanocobalamin (VITAMIN B-12 CR PO) Take 1 tablet by mouth daily.      VITAMIN E 400 mg by Does not apply route daily.      atorvastatin (LIPITOR) 10 MG tablet TAKE 1 TABLET BY MOUTH ON MONDAY, WEDNESDAY, AND FRIDAY 38 tablet 1     No current facility-administered medications for this visit.        HPI: Patient comes in for follow-up visit.  She is now 63 years of age.  She is following up with her endocrinologist for management of her insulin-dependent diabetes mellitus.  She did not have her previsit labs done yet.  She denies any chest pain or shortness of breath.  She

## 2024-01-20 ENCOUNTER — HOSPITAL ENCOUNTER (OUTPATIENT)
Age: 64
Discharge: HOME | End: 2024-01-20
Payer: COMMERCIAL

## 2024-01-20 DIAGNOSIS — E78.00 PURE HYPERCHOLESTEROLEMIA: ICD-10-CM

## 2024-01-20 DIAGNOSIS — E03.9 ACQUIRED HYPOTHYROIDISM: ICD-10-CM

## 2024-01-20 DIAGNOSIS — I10 ESSENTIAL HYPERTENSION: ICD-10-CM

## 2024-01-20 LAB
ALBUMIN SERPL-MCNC: 4.3 G/DL (ref 3.5–5.2)
ALP SERPL-CCNC: 76 U/L (ref 35–104)
ALT SERPL-CCNC: 18 U/L (ref 0–32)
ANION GAP SERPL CALCULATED.3IONS-SCNC: 10 MMOL/L (ref 7–16)
AST SERPL-CCNC: 20 U/L (ref 0–31)
BILIRUB SERPL-MCNC: 0.9 MG/DL (ref 0–1.2)
BUN SERPL-MCNC: 15 MG/DL (ref 6–23)
CALCIUM SERPL-MCNC: 9.3 MG/DL (ref 8.6–10.2)
CHLORIDE SERPL-SCNC: 104 MMOL/L (ref 98–107)
CHOLEST SERPL-MCNC: 179 MG/DL
CO2 SERPL-SCNC: 27 MMOL/L (ref 22–29)
CREAT SERPL-MCNC: 1 MG/DL (ref 0.5–1)
ERYTHROCYTE [DISTWIDTH] IN BLOOD BY AUTOMATED COUNT: 12.5 % (ref 11.5–15)
GFR SERPL CREATININE-BSD FRML MDRD: >60 ML/MIN/1.73M2
GLUCOSE SERPL-MCNC: 188 MG/DL (ref 74–99)
HCT VFR BLD AUTO: 44.2 % (ref 34–48)
HDLC SERPL-MCNC: 100 MG/DL
HGB BLD-MCNC: 14.8 G/DL (ref 11.5–15.5)
LDLC SERPL CALC-MCNC: 67 MG/DL
MCH RBC QN AUTO: 31.1 PG (ref 26–35)
MCHC RBC AUTO-ENTMCNC: 33.5 G/DL (ref 32–34.5)
MCV RBC AUTO: 92.9 FL (ref 80–99.9)
PLATELET # BLD AUTO: 203 K/UL (ref 130–450)
PMV BLD AUTO: 12 FL (ref 7–12)
POTASSIUM SERPL-SCNC: 4.3 MMOL/L (ref 3.5–5)
PROT SERPL-MCNC: 6.9 G/DL (ref 6.4–8.3)
RBC # BLD AUTO: 4.76 M/UL (ref 3.5–5.5)
SODIUM SERPL-SCNC: 141 MMOL/L (ref 132–146)
T4 FREE SERPL-MCNC: 1.5 NG/DL (ref 0.9–1.7)
TRIGL SERPL-MCNC: 58 MG/DL
TSH SERPL DL<=0.05 MIU/L-ACNC: 2.13 UIU/ML (ref 0.27–4.2)
VLDLC SERPL CALC-MCNC: 12 MG/DL
WBC OTHER # BLD: 7.2 K/UL (ref 4.5–11.5)

## 2024-01-20 PROCEDURE — 85027 COMPLETE CBC AUTOMATED: CPT

## 2024-01-20 PROCEDURE — 80061 LIPID PANEL: CPT

## 2024-01-20 PROCEDURE — 84443 ASSAY THYROID STIM HORMONE: CPT

## 2024-01-20 PROCEDURE — 84439 ASSAY OF FREE THYROXINE: CPT

## 2024-01-20 PROCEDURE — 80053 COMPREHEN METABOLIC PANEL: CPT

## 2024-01-27 DIAGNOSIS — E78.2 MIXED HYPERLIPIDEMIA: Primary | ICD-10-CM

## 2024-01-29 RX ORDER — ATORVASTATIN CALCIUM 10 MG/1
TABLET, FILM COATED ORAL
Qty: 38 TABLET | Refills: 1 | Status: SHIPPED | OUTPATIENT
Start: 2024-01-29

## 2024-02-07 PROBLEM — E10.9 TYPE 1 DIABETES MELLITUS WITHOUT COMPLICATION (HCC): Status: ACTIVE | Noted: 2024-02-07

## 2024-03-04 RX ORDER — LISINOPRIL 20 MG/1
20 TABLET ORAL DAILY
Qty: 90 TABLET | Refills: 0 | Status: SHIPPED | OUTPATIENT
Start: 2024-03-04

## 2024-04-12 DIAGNOSIS — E55.9 VITAMIN D DEFICIENCY: ICD-10-CM

## 2024-04-12 RX ORDER — ERGOCALCIFEROL 1.25 MG/1
CAPSULE ORAL
Qty: 12 CAPSULE | Refills: 0 | Status: SHIPPED | OUTPATIENT
Start: 2024-04-12

## 2024-04-15 RX ORDER — ERGOCALCIFEROL 1.25 MG/1
CAPSULE ORAL
Qty: 12 CAPSULE | Refills: 0 | Status: SHIPPED | OUTPATIENT
Start: 2024-04-15

## 2024-04-30 ENCOUNTER — TELEPHONE (OUTPATIENT)
Dept: ENDOCRINOLOGY | Age: 64
End: 2024-04-30

## 2024-04-30 NOTE — TELEPHONE ENCOUNTER
Pt called in asking for a return call from the clinical staff.  She is having issues getting her pump supplies.  She said she has 1 week of supplies at this time, but will be out in a week.  Medicare is giving her issues telling  [employer]  that they cannot release the supplies due to pt being scheduled past 90 days.

## 2024-05-01 DIAGNOSIS — E10.65 TYPE 1 DIABETES MELLITUS WITH HYPERGLYCEMIA (HCC): Primary | ICD-10-CM

## 2024-05-01 NOTE — TELEPHONE ENCOUNTER
Jerson from [facility], stated she needed an apt and lab work , he will call the patient

## 2024-05-02 DIAGNOSIS — E03.9 ACQUIRED HYPOTHYROIDISM: ICD-10-CM

## 2024-05-02 RX ORDER — LEVOTHYROXINE SODIUM 0.03 MG/1
TABLET ORAL
Qty: 90 TABLET | Refills: 0 | Status: SHIPPED | OUTPATIENT
Start: 2024-05-02

## 2024-05-04 ENCOUNTER — HOSPITAL ENCOUNTER (OUTPATIENT)
Age: 64
Discharge: HOME | End: 2024-05-04
Payer: COMMERCIAL

## 2024-05-04 DIAGNOSIS — E10.65 TYPE 1 DIABETES MELLITUS WITH HYPERGLYCEMIA (HCC): ICD-10-CM

## 2024-05-04 LAB
ANION GAP SERPL CALCULATED.3IONS-SCNC: 8 MMOL/L (ref 7–16)
BUN SERPL-MCNC: 11 MG/DL (ref 6–23)
CALCIUM SERPL-MCNC: 9.4 MG/DL (ref 8.6–10.2)
CHLORIDE SERPL-SCNC: 100 MMOL/L (ref 98–107)
CO2 SERPL-SCNC: 28 MMOL/L (ref 22–29)
CREAT SERPL-MCNC: 0.7 MG/DL (ref 0.5–1)
GFR, ESTIMATED: >90 ML/MIN/1.73M2
GLUCOSE SERPL-MCNC: 202 MG/DL (ref 74–99)
POTASSIUM SERPL-SCNC: 4.7 MMOL/L (ref 3.5–5)
SODIUM SERPL-SCNC: 136 MMOL/L (ref 132–146)

## 2024-05-04 PROCEDURE — 36415 COLL VENOUS BLD VENIPUNCTURE: CPT

## 2024-05-04 PROCEDURE — 80048 BASIC METABOLIC PNL TOTAL CA: CPT

## 2024-05-04 PROCEDURE — 84681 ASSAY OF C-PEPTIDE: CPT

## 2024-05-07 ENCOUNTER — OFFICE VISIT (OUTPATIENT)
Dept: ENDOCRINOLOGY | Age: 64
End: 2024-05-07
Payer: COMMERCIAL

## 2024-05-07 VITALS
HEART RATE: 84 BPM | HEIGHT: 68 IN | OXYGEN SATURATION: 95 % | SYSTOLIC BLOOD PRESSURE: 117 MMHG | DIASTOLIC BLOOD PRESSURE: 68 MMHG | WEIGHT: 141.4 LBS | RESPIRATION RATE: 18 BRPM | BODY MASS INDEX: 21.43 KG/M2

## 2024-05-07 DIAGNOSIS — E03.9 HYPOTHYROIDISM, UNSPECIFIED TYPE: ICD-10-CM

## 2024-05-07 DIAGNOSIS — E55.9 VITAMIN D DEFICIENCY: ICD-10-CM

## 2024-05-07 DIAGNOSIS — E78.2 MIXED HYPERLIPIDEMIA: ICD-10-CM

## 2024-05-07 DIAGNOSIS — E10.65 TYPE 1 DIABETES MELLITUS WITH HYPERGLYCEMIA (HCC): Primary | ICD-10-CM

## 2024-05-07 LAB
C PEPTIDE SERPL-MCNC: <0 NG/ML (ref 1.1–4.4)
HBA1C MFR BLD: 8 %

## 2024-05-07 PROCEDURE — 95251 CONT GLUC MNTR ANALYSIS I&R: CPT | Performed by: FAMILY MEDICINE

## 2024-05-07 PROCEDURE — 3017F COLORECTAL CA SCREEN DOC REV: CPT | Performed by: FAMILY MEDICINE

## 2024-05-07 PROCEDURE — 1123F ACP DISCUSS/DSCN MKR DOCD: CPT | Performed by: FAMILY MEDICINE

## 2024-05-07 PROCEDURE — 4004F PT TOBACCO SCREEN RCVD TLK: CPT | Performed by: FAMILY MEDICINE

## 2024-05-07 PROCEDURE — 3074F SYST BP LT 130 MM HG: CPT | Performed by: FAMILY MEDICINE

## 2024-05-07 PROCEDURE — 99214 OFFICE O/P EST MOD 30 MIN: CPT | Performed by: FAMILY MEDICINE

## 2024-05-07 PROCEDURE — 83036 HEMOGLOBIN GLYCOSYLATED A1C: CPT | Performed by: FAMILY MEDICINE

## 2024-05-07 PROCEDURE — G8427 DOCREV CUR MEDS BY ELIG CLIN: HCPCS | Performed by: FAMILY MEDICINE

## 2024-05-07 PROCEDURE — 3078F DIAST BP <80 MM HG: CPT | Performed by: FAMILY MEDICINE

## 2024-05-07 PROCEDURE — 3052F HG A1C>EQUAL 8.0%<EQUAL 9.0%: CPT | Performed by: FAMILY MEDICINE

## 2024-05-07 PROCEDURE — 2022F DILAT RTA XM EVC RTNOPTHY: CPT | Performed by: FAMILY MEDICINE

## 2024-05-07 PROCEDURE — G8400 PT W/DXA NO RESULTS DOC: HCPCS | Performed by: FAMILY MEDICINE

## 2024-05-07 PROCEDURE — 1090F PRES/ABSN URINE INCON ASSESS: CPT | Performed by: FAMILY MEDICINE

## 2024-05-07 PROCEDURE — G8420 CALC BMI NORM PARAMETERS: HCPCS | Performed by: FAMILY MEDICINE

## 2024-06-03 DIAGNOSIS — I10 ESSENTIAL HYPERTENSION: ICD-10-CM

## 2024-06-04 RX ORDER — LISINOPRIL 20 MG/1
20 TABLET ORAL DAILY
Qty: 90 TABLET | Refills: 1 | Status: SHIPPED | OUTPATIENT
Start: 2024-06-04

## 2024-06-04 RX ORDER — AMLODIPINE BESYLATE 5 MG/1
5 TABLET ORAL DAILY
Qty: 90 TABLET | Refills: 1 | Status: SHIPPED | OUTPATIENT
Start: 2024-06-04

## 2024-07-05 DIAGNOSIS — E55.9 VITAMIN D DEFICIENCY: ICD-10-CM

## 2024-07-05 RX ORDER — ERGOCALCIFEROL 1.25 MG/1
50000 CAPSULE ORAL WEEKLY
Qty: 12 CAPSULE | Refills: 0 | Status: SHIPPED | OUTPATIENT
Start: 2024-07-05

## 2024-07-08 DIAGNOSIS — E55.9 VITAMIN D DEFICIENCY: ICD-10-CM

## 2024-07-08 RX ORDER — ERGOCALCIFEROL 1.25 MG/1
50000 CAPSULE ORAL WEEKLY
Qty: 12 CAPSULE | Refills: 0 | Status: SHIPPED | OUTPATIENT
Start: 2024-07-08

## 2024-07-15 DIAGNOSIS — Z78.0 MENOPAUSE: ICD-10-CM

## 2024-07-15 LAB — MAMMOGRAPHY, EXTERNAL: NORMAL

## 2024-07-16 RX ORDER — ESTRADIOL/NORETHINDRONE ACETATE TRANSDERMAL SYSTEM .05; .14 MG/D; MG/D
1 PATCH, EXTENDED RELEASE TRANSDERMAL
Qty: 26 PATCH | Refills: 3 | Status: SHIPPED | OUTPATIENT
Start: 2024-07-18

## 2024-07-29 DIAGNOSIS — E78.2 MIXED HYPERLIPIDEMIA: ICD-10-CM

## 2024-07-29 RX ORDER — ATORVASTATIN CALCIUM 10 MG/1
TABLET, FILM COATED ORAL
Qty: 38 TABLET | Refills: 3 | Status: SHIPPED | OUTPATIENT
Start: 2024-07-29

## 2024-08-01 ENCOUNTER — OFFICE VISIT (OUTPATIENT)
Dept: ENDOCRINOLOGY | Age: 64
End: 2024-08-01
Payer: COMMERCIAL

## 2024-08-01 VITALS
RESPIRATION RATE: 87 BRPM | DIASTOLIC BLOOD PRESSURE: 79 MMHG | SYSTOLIC BLOOD PRESSURE: 148 MMHG | HEIGHT: 67 IN | WEIGHT: 138 LBS | BODY MASS INDEX: 21.66 KG/M2 | HEART RATE: 84 BPM

## 2024-08-01 DIAGNOSIS — E03.9 HYPOTHYROIDISM, UNSPECIFIED TYPE: ICD-10-CM

## 2024-08-01 DIAGNOSIS — E10.65 TYPE 1 DIABETES MELLITUS WITH HYPERGLYCEMIA (HCC): Primary | ICD-10-CM

## 2024-08-01 LAB — HBA1C MFR BLD: 8 %

## 2024-08-01 PROCEDURE — 83036 HEMOGLOBIN GLYCOSYLATED A1C: CPT | Performed by: FAMILY MEDICINE

## 2024-08-01 PROCEDURE — 3052F HG A1C>EQUAL 8.0%<EQUAL 9.0%: CPT | Performed by: FAMILY MEDICINE

## 2024-08-01 PROCEDURE — 99214 OFFICE O/P EST MOD 30 MIN: CPT | Performed by: FAMILY MEDICINE

## 2024-08-01 PROCEDURE — 3078F DIAST BP <80 MM HG: CPT | Performed by: FAMILY MEDICINE

## 2024-08-01 PROCEDURE — 2022F DILAT RTA XM EVC RTNOPTHY: CPT | Performed by: FAMILY MEDICINE

## 2024-08-01 PROCEDURE — 1090F PRES/ABSN URINE INCON ASSESS: CPT | Performed by: FAMILY MEDICINE

## 2024-08-01 PROCEDURE — G8427 DOCREV CUR MEDS BY ELIG CLIN: HCPCS | Performed by: FAMILY MEDICINE

## 2024-08-01 PROCEDURE — 4004F PT TOBACCO SCREEN RCVD TLK: CPT | Performed by: FAMILY MEDICINE

## 2024-08-01 PROCEDURE — 1123F ACP DISCUSS/DSCN MKR DOCD: CPT | Performed by: FAMILY MEDICINE

## 2024-08-01 PROCEDURE — 95251 CONT GLUC MNTR ANALYSIS I&R: CPT | Performed by: FAMILY MEDICINE

## 2024-08-01 PROCEDURE — G8420 CALC BMI NORM PARAMETERS: HCPCS | Performed by: FAMILY MEDICINE

## 2024-08-01 PROCEDURE — 3077F SYST BP >= 140 MM HG: CPT | Performed by: FAMILY MEDICINE

## 2024-08-01 PROCEDURE — G8400 PT W/DXA NO RESULTS DOC: HCPCS | Performed by: FAMILY MEDICINE

## 2024-08-01 PROCEDURE — 3017F COLORECTAL CA SCREEN DOC REV: CPT | Performed by: FAMILY MEDICINE

## 2024-08-01 NOTE — PROGRESS NOTES
[facility]  [facility] Department of Endocrinology Diabetes and Metabolism   [address], [place] [address]  Phone: [phone]  Fax: [phone]    Date of Service: 8/1/2024  Primary Care Physician: Zoie Baker PA-C  Provider: MYRTLE Guerrero CNP    Reason for the visit:    DM type 1     History of Present Illness:  The history is provided by the patient. No  was used. Accuracy of the patient data is excellent.  Ani Torres is a very pleasant 64 y.o. female seen today for diabetes management     Ani Torres was diagnosed with diabetes at age 34  and currently on Medtronic 780g insulin pump with Guardian sensor (upgraded in Jan)    Current  settings:    Basal rate 12a 0.70, CR 9.5, 5pm 17, ISF 52, target 100-150, active insulin time 2:00 hrs, smart guard target 100      TIR 71%  Hyperglycemia 28%  Hypoglycemia 1%  Avg glucose 157  GMI 7.1%    Had 2 pumps failed since last appointment.  Had some issues getting her settings transferred.  This explains her hemoglobin A1c not coming down.  A GMI in the last 2 weeks was 7.1% reflecting improving control      Most recent A1c results summarized below  Lab Results   Component Value Date/Time    LABA1C 8.0 08/01/2024 03:49 PM    LABA1C 8.0 05/07/2024 02:45 PM    LABA1C 8.2 09/05/2023 03:49 PM     She has infrequent hypoglycemia  The patient has been mindful of what has been eating and following diabetic diet as encouraged  I reviewed current medications and the patient has no issues with diabetes medications  Ani Torres is due for eye exam. Has upcoming appt this Saturday   The patient performs her own feet care  Microvascular complications:  No Retinopathy, Nephropathy or Neuropathy   Macrovascular complications: no CAD, PVD, or Stroke  The patient receives Flushot every year    On Levothyroxine 25 mcg daily.   Patient takes levothyroxine in the morning at empty stomach, wait one hour before eating ,

## 2024-08-06 DIAGNOSIS — E03.9 ACQUIRED HYPOTHYROIDISM: ICD-10-CM

## 2024-08-07 RX ORDER — LEVOTHYROXINE SODIUM 0.03 MG/1
TABLET ORAL
Qty: 30 TABLET | Refills: 0 | Status: SHIPPED | OUTPATIENT
Start: 2024-08-07

## 2024-08-25 SDOH — HEALTH STABILITY: PHYSICAL HEALTH: ON AVERAGE, HOW MANY MINUTES DO YOU ENGAGE IN EXERCISE AT THIS LEVEL?: 20 MIN

## 2024-08-25 SDOH — HEALTH STABILITY: PHYSICAL HEALTH: ON AVERAGE, HOW MANY DAYS PER WEEK DO YOU ENGAGE IN MODERATE TO STRENUOUS EXERCISE (LIKE A BRISK WALK)?: 5 DAYS

## 2024-08-26 ENCOUNTER — OFFICE VISIT (OUTPATIENT)
Dept: PRIMARY CARE CLINIC | Age: 64
End: 2024-08-26
Payer: COMMERCIAL

## 2024-08-26 VITALS
TEMPERATURE: 97 F | HEART RATE: 78 BPM | DIASTOLIC BLOOD PRESSURE: 82 MMHG | SYSTOLIC BLOOD PRESSURE: 128 MMHG | RESPIRATION RATE: 17 BRPM | WEIGHT: 145 LBS | HEIGHT: 67 IN | OXYGEN SATURATION: 98 % | BODY MASS INDEX: 22.76 KG/M2

## 2024-08-26 DIAGNOSIS — Z76.89 ENCOUNTER TO ESTABLISH CARE: ICD-10-CM

## 2024-08-26 DIAGNOSIS — E78.00 PURE HYPERCHOLESTEROLEMIA: ICD-10-CM

## 2024-08-26 DIAGNOSIS — Z87.891 PERSONAL HISTORY OF TOBACCO USE: ICD-10-CM

## 2024-08-26 DIAGNOSIS — E03.9 ACQUIRED HYPOTHYROIDISM: ICD-10-CM

## 2024-08-26 DIAGNOSIS — E10.9 TYPE 1 DIABETES MELLITUS WITHOUT COMPLICATION (HCC): ICD-10-CM

## 2024-08-26 DIAGNOSIS — I10 ESSENTIAL HYPERTENSION: ICD-10-CM

## 2024-08-26 DIAGNOSIS — Z00.00 WELCOME TO MEDICARE PREVENTIVE VISIT: Primary | ICD-10-CM

## 2024-08-26 PROBLEM — E10.22 TYPE 1 DIABETES MELLITUS WITH DIABETIC CHRONIC KIDNEY DISEASE (HCC): Status: RESOLVED | Noted: 2024-08-26 | Resolved: 2024-08-26

## 2024-08-26 PROBLEM — E10.65 TYPE 1 DIABETES MELLITUS WITH HYPERGLYCEMIA (HCC): Status: RESOLVED | Noted: 2024-08-26 | Resolved: 2024-08-26

## 2024-08-26 PROBLEM — E10.65 TYPE 1 DIABETES MELLITUS WITH HYPERGLYCEMIA (HCC): Status: ACTIVE | Noted: 2024-08-26

## 2024-08-26 PROBLEM — E10.22 TYPE 1 DIABETES MELLITUS WITH DIABETIC CHRONIC KIDNEY DISEASE (HCC): Status: ACTIVE | Noted: 2024-08-26

## 2024-08-26 PROCEDURE — 3017F COLORECTAL CA SCREEN DOC REV: CPT | Performed by: PHYSICIAN ASSISTANT

## 2024-08-26 PROCEDURE — G0402 INITIAL PREVENTIVE EXAM: HCPCS | Performed by: PHYSICIAN ASSISTANT

## 2024-08-26 PROCEDURE — 1123F ACP DISCUSS/DSCN MKR DOCD: CPT | Performed by: PHYSICIAN ASSISTANT

## 2024-08-26 PROCEDURE — 3074F SYST BP LT 130 MM HG: CPT | Performed by: PHYSICIAN ASSISTANT

## 2024-08-26 PROCEDURE — 3079F DIAST BP 80-89 MM HG: CPT | Performed by: PHYSICIAN ASSISTANT

## 2024-08-26 PROCEDURE — G0296 VISIT TO DETERM LDCT ELIG: HCPCS | Performed by: PHYSICIAN ASSISTANT

## 2024-08-26 PROCEDURE — 3052F HG A1C>EQUAL 8.0%<EQUAL 9.0%: CPT | Performed by: PHYSICIAN ASSISTANT

## 2024-08-26 RX ORDER — GABAPENTIN 300 MG/1
CAPSULE ORAL
COMMUNITY
Start: 2024-07-16

## 2024-08-26 SDOH — ECONOMIC STABILITY: INCOME INSECURITY: HOW HARD IS IT FOR YOU TO PAY FOR THE VERY BASICS LIKE FOOD, HOUSING, MEDICAL CARE, AND HEATING?: NOT HARD AT ALL

## 2024-08-26 SDOH — ECONOMIC STABILITY: FOOD INSECURITY: WITHIN THE PAST 12 MONTHS, THE FOOD YOU BOUGHT JUST DIDN'T LAST AND YOU DIDN'T HAVE MONEY TO GET MORE.: NEVER TRUE

## 2024-08-26 SDOH — ECONOMIC STABILITY: FOOD INSECURITY: WITHIN THE PAST 12 MONTHS, YOU WORRIED THAT YOUR FOOD WOULD RUN OUT BEFORE YOU GOT MONEY TO BUY MORE.: NEVER TRUE

## 2024-08-26 ASSESSMENT — PATIENT HEALTH QUESTIONNAIRE - PHQ9
SUM OF ALL RESPONSES TO PHQ QUESTIONS 1-9: 0
2. FEELING DOWN, DEPRESSED OR HOPELESS: NOT AT ALL
SUM OF ALL RESPONSES TO PHQ QUESTIONS 1-9: 0
SUM OF ALL RESPONSES TO PHQ9 QUESTIONS 1 & 2: 0
1. LITTLE INTEREST OR PLEASURE IN DOING THINGS: NOT AT ALL
SUM OF ALL RESPONSES TO PHQ QUESTIONS 1-9: 0
SUM OF ALL RESPONSES TO PHQ QUESTIONS 1-9: 0

## 2024-08-26 ASSESSMENT — LIFESTYLE VARIABLES
HOW OFTEN DO YOU HAVE A DRINK CONTAINING ALCOHOL: NEVER
HOW MANY STANDARD DRINKS CONTAINING ALCOHOL DO YOU HAVE ON A TYPICAL DAY: PATIENT DOES NOT DRINK

## 2024-08-26 NOTE — PROGRESS NOTES
Medicare Annual Wellness Visit    Ani Torres is here for Hypertension, Establish Care, and Medicare AWV (Welcome to medicare)    Assessment & Plan   Welcome to Medicare preventive visit  Personal history of tobacco use  -     SC VISIT TO DISCUSS LUNG CA SCREEN W LDCT  -     CT Lung Screen (Initial/Annual); Future  Type 1 diabetes mellitus without complication (HCC)  -     Diabetic Foot Exam  Pure hypercholesterolemia  Acquired hypothyroidism  Essential hypertension  Encounter to establish care    Recommendations for Preventive Services Due: see orders and patient instructions/AVS.  Recommended screening schedule for the next 5-10 years is provided to the patient in written form: see Patient Instructions/AVS.     Return in 6 months (on 2025) for Medicare Annual Wellness Visit in 1 year, DM2.     Subjective   The following acute and/or chronic problems were also addressed today:  A1c 8% on   Has not seen podiatry  Retinal exam 3 weeks ago  1 ppd  Dr Blanc is GYN, recently stopped Combipatch bc she is a smoker    Patient's complete Health Risk Assessment and screening values have been reviewed and are found in Flowsheets. The following problems were reviewed today and where indicated follow up appointments were made and/or referrals ordered.    Positive Risk Factor Screenings with Interventions:                         Tobacco Use:    Tobacco Use      Smoking status: Every Day        Packs/day: 0.00        Years: 0.5 packs/day for 45.0 years (22.5 ttl pk-yrs)        Types: Cigarettes        Start date: 12/3/1966        Last attempt to quit: 12/3/2011        Years since quittin.7      Smokeless tobacco: Never      Tobacco comments: Have quit 4 or 5 times, when pregnant & after a surgery     Interventions:  Patient declined any further intervention or treatment                Objective   Vitals:    24 1334   BP: 128/82   Pulse: 78   Resp: 17   Temp: 97 °F (36.1 °C)   SpO2: 98%   Weight: 65.8 kg

## 2024-08-26 NOTE — PROGRESS NOTES
Discussed with the patient the current USPSTF guidelines released March 9, 2021 for screening for lung cancer.    For adults aged 50 to 80 years who have a 20 pack-year smoking history and currently smoke or have quit within the past 15 years the grade B recommendation is to:  Screen for lung cancer with low-dose computed tomography (LDCT) every year.  Stop screening once a person has not smoked for 15 years or has a health problem that limits life expectancy or the ability to have lung surgery.    The patient  reports that she has been smoking cigarettes. She started smoking about 57 years ago. She has a 22.5 pack-year smoking history. She has never used smokeless tobacco.. Discussed with patient the risks and benefits of screening, including over-diagnosis, false positive rate, and total radiation exposure.  The patient currently exhibits no signs or symptoms suggestive of lung cancer.  Discussed with patient the importance of compliance with yearly annual lung cancer screenings and willingness to undergo diagnosis and treatment if screening scan is positive.  In addition, the patient was counseled regarding the importance of remaining smoke free and/or total smoking cessation.    Also reviewed the following if the patient has Medicare that as of February 10, 2022, Medicare only covers LDCT screening in patients aged 50-77 with at least a 20 pack-year smoking history who currently smoke or have quit in the last 15 years

## 2024-08-31 ENCOUNTER — HOSPITAL ENCOUNTER (OUTPATIENT)
Age: 64
Discharge: HOME | End: 2024-08-31
Payer: COMMERCIAL

## 2024-08-31 DIAGNOSIS — E10.65 TYPE 1 DIABETES MELLITUS WITH HYPERGLYCEMIA (HCC): ICD-10-CM

## 2024-08-31 DIAGNOSIS — E03.9 HYPOTHYROIDISM, UNSPECIFIED TYPE: ICD-10-CM

## 2024-08-31 LAB
25(OH)D3 SERPL-MCNC: 63.1 NG/ML (ref 30–100)
ALBUMIN SERPL-MCNC: 4.3 G/DL (ref 3.5–5.2)
ALP SERPL-CCNC: 82 U/L (ref 35–104)
ALT SERPL-CCNC: 22 U/L (ref 0–32)
ANION GAP SERPL CALCULATED.3IONS-SCNC: 9 MMOL/L (ref 7–16)
AST SERPL-CCNC: 25 U/L (ref 0–31)
BASOPHILS # BLD: 0.11 K/UL (ref 0–0.2)
BASOPHILS NFR BLD: 1 % (ref 0–2)
BILIRUB SERPL-MCNC: 0.7 MG/DL (ref 0–1.2)
BUN SERPL-MCNC: 14 MG/DL (ref 6–23)
CALCIUM SERPL-MCNC: 9.6 MG/DL (ref 8.6–10.2)
CHLORIDE SERPL-SCNC: 103 MMOL/L (ref 98–107)
CHOLEST SERPL-MCNC: 176 MG/DL
CO2 SERPL-SCNC: 28 MMOL/L (ref 22–29)
CREAT SERPL-MCNC: 0.6 MG/DL (ref 0.5–1)
CREAT UR-MCNC: 112.4 MG/DL (ref 29–226)
EOSINOPHIL # BLD: 0.17 K/UL (ref 0.05–0.5)
EOSINOPHILS RELATIVE PERCENT: 2 % (ref 0–6)
ERYTHROCYTE [DISTWIDTH] IN BLOOD BY AUTOMATED COUNT: 12.5 % (ref 11.5–15)
GFR, ESTIMATED: >90 ML/MIN/1.73M2
GLUCOSE SERPL-MCNC: 212 MG/DL (ref 74–99)
HCT VFR BLD AUTO: 44.6 % (ref 34–48)
HDLC SERPL-MCNC: 96 MG/DL
HGB BLD-MCNC: 14.6 G/DL (ref 11.5–15.5)
IMM GRANULOCYTES # BLD AUTO: 0.04 K/UL (ref 0–0.58)
IMM GRANULOCYTES NFR BLD: 0 % (ref 0–5)
LDLC SERPL CALC-MCNC: 72 MG/DL
LYMPHOCYTES NFR BLD: 2.17 K/UL (ref 1.5–4)
LYMPHOCYTES RELATIVE PERCENT: 21 % (ref 20–42)
MCH RBC QN AUTO: 30.8 PG (ref 26–35)
MCHC RBC AUTO-ENTMCNC: 32.7 G/DL (ref 32–34.5)
MCV RBC AUTO: 94.1 FL (ref 80–99.9)
MICROALBUMIN UR-MCNC: 17 MG/L (ref 0–19)
MICROALBUMIN/CREAT UR-RTO: 15 MCG/MG CREAT (ref 0–30)
MONOCYTES NFR BLD: 0.71 K/UL (ref 0.1–0.95)
MONOCYTES NFR BLD: 7 % (ref 2–12)
NEUTROPHILS NFR BLD: 68 % (ref 43–80)
NEUTS SEG NFR BLD: 6.95 K/UL (ref 1.8–7.3)
PLATELET # BLD AUTO: 183 K/UL (ref 130–450)
PMV BLD AUTO: 11.6 FL (ref 7–12)
POTASSIUM SERPL-SCNC: 4.6 MMOL/L (ref 3.5–5)
PROT SERPL-MCNC: 7.4 G/DL (ref 6.4–8.3)
RBC # BLD AUTO: 4.74 M/UL (ref 3.5–5.5)
SODIUM SERPL-SCNC: 140 MMOL/L (ref 132–146)
T4 FREE SERPL-MCNC: 1.3 NG/DL (ref 0.9–1.7)
TRIGL SERPL-MCNC: 42 MG/DL
TSH SERPL DL<=0.05 MIU/L-ACNC: 2.04 UIU/ML (ref 0.27–4.2)
VLDLC SERPL CALC-MCNC: 8 MG/DL
WBC OTHER # BLD: 10.2 K/UL (ref 4.5–11.5)

## 2024-08-31 PROCEDURE — 36415 COLL VENOUS BLD VENIPUNCTURE: CPT

## 2024-08-31 PROCEDURE — 84443 ASSAY THYROID STIM HORMONE: CPT

## 2024-08-31 PROCEDURE — 84439 ASSAY OF FREE THYROXINE: CPT

## 2024-08-31 PROCEDURE — 82306 VITAMIN D 25 HYDROXY: CPT

## 2024-08-31 PROCEDURE — 85025 COMPLETE CBC W/AUTO DIFF WBC: CPT

## 2024-08-31 PROCEDURE — 80053 COMPREHEN METABOLIC PANEL: CPT

## 2024-08-31 PROCEDURE — 80061 LIPID PANEL: CPT

## 2024-08-31 PROCEDURE — 82043 UR ALBUMIN QUANTITATIVE: CPT

## 2024-08-31 PROCEDURE — 82570 ASSAY OF URINE CREATININE: CPT

## 2024-09-02 DIAGNOSIS — E03.9 ACQUIRED HYPOTHYROIDISM: ICD-10-CM

## 2024-09-03 ENCOUNTER — TELEPHONE (OUTPATIENT)
Dept: ENDOCRINOLOGY | Age: 64
End: 2024-09-03

## 2024-09-03 RX ORDER — LEVOTHYROXINE SODIUM 25 UG/1
TABLET ORAL
Qty: 30 TABLET | Refills: 3 | Status: SHIPPED | OUTPATIENT
Start: 2024-09-03

## 2024-10-28 ENCOUNTER — OFFICE VISIT (OUTPATIENT)
Dept: ENDOCRINOLOGY | Age: 64
End: 2024-10-28
Payer: COMMERCIAL

## 2024-10-28 VITALS
OXYGEN SATURATION: 99 % | SYSTOLIC BLOOD PRESSURE: 148 MMHG | HEART RATE: 75 BPM | HEIGHT: 68 IN | BODY MASS INDEX: 22.04 KG/M2 | DIASTOLIC BLOOD PRESSURE: 78 MMHG | RESPIRATION RATE: 18 BRPM | WEIGHT: 145.4 LBS

## 2024-10-28 DIAGNOSIS — E78.2 MIXED HYPERLIPIDEMIA: ICD-10-CM

## 2024-10-28 DIAGNOSIS — E03.9 HYPOTHYROIDISM, UNSPECIFIED TYPE: ICD-10-CM

## 2024-10-28 DIAGNOSIS — E10.65 TYPE 1 DIABETES MELLITUS WITH HYPERGLYCEMIA (HCC): Primary | ICD-10-CM

## 2024-10-28 DIAGNOSIS — E55.9 VITAMIN D DEFICIENCY: ICD-10-CM

## 2024-10-28 LAB — HBA1C MFR BLD: 8 %

## 2024-10-28 PROCEDURE — G8420 CALC BMI NORM PARAMETERS: HCPCS | Performed by: FAMILY MEDICINE

## 2024-10-28 PROCEDURE — 4004F PT TOBACCO SCREEN RCVD TLK: CPT | Performed by: FAMILY MEDICINE

## 2024-10-28 PROCEDURE — 3078F DIAST BP <80 MM HG: CPT | Performed by: FAMILY MEDICINE

## 2024-10-28 PROCEDURE — 2022F DILAT RTA XM EVC RTNOPTHY: CPT | Performed by: FAMILY MEDICINE

## 2024-10-28 PROCEDURE — 95251 CONT GLUC MNTR ANALYSIS I&R: CPT | Performed by: FAMILY MEDICINE

## 2024-10-28 PROCEDURE — G8484 FLU IMMUNIZE NO ADMIN: HCPCS | Performed by: FAMILY MEDICINE

## 2024-10-28 PROCEDURE — 83036 HEMOGLOBIN GLYCOSYLATED A1C: CPT | Performed by: FAMILY MEDICINE

## 2024-10-28 PROCEDURE — G8400 PT W/DXA NO RESULTS DOC: HCPCS | Performed by: FAMILY MEDICINE

## 2024-10-28 PROCEDURE — 99214 OFFICE O/P EST MOD 30 MIN: CPT | Performed by: FAMILY MEDICINE

## 2024-10-28 PROCEDURE — 3017F COLORECTAL CA SCREEN DOC REV: CPT | Performed by: FAMILY MEDICINE

## 2024-10-28 PROCEDURE — G8427 DOCREV CUR MEDS BY ELIG CLIN: HCPCS | Performed by: FAMILY MEDICINE

## 2024-10-28 PROCEDURE — 3052F HG A1C>EQUAL 8.0%<EQUAL 9.0%: CPT | Performed by: FAMILY MEDICINE

## 2024-10-28 PROCEDURE — 1090F PRES/ABSN URINE INCON ASSESS: CPT | Performed by: FAMILY MEDICINE

## 2024-10-28 PROCEDURE — 1123F ACP DISCUSS/DSCN MKR DOCD: CPT | Performed by: FAMILY MEDICINE

## 2024-10-28 PROCEDURE — 3077F SYST BP >= 140 MM HG: CPT | Performed by: FAMILY MEDICINE

## 2024-10-28 NOTE — PROGRESS NOTES
[facility] [employer]  [facility] Department of Endocrinology Diabetes and Metabolism   [address], [address], [place] [address]  Phone: [phone]  Fax: [phone]    Date of Service: 10/28/2024  Primary Care Physician: Zoie Baker PA-C  Provider: MYRTLE Guerrero CNP    Reason for the visit:    DM type 1     History of Present Illness:  The history is provided by the patient. No  was used. Accuracy of the patient data is excellent.  Ani Torres is a very pleasant 64 y.o. female seen today for diabetes management     Ani Torres was diagnosed with diabetes at age 34  and currently on Medtronic 780g insulin pump with Guardian sensor (upgraded in Jan)    Current  settings:    Basal rate 12a 0.70, CR 9.5, ISF 52, target 100-150, active insulin time 2:00 hrs, smart guard target 100      TIR 80%  Hyperglycemia 19%  Hypoglycemia 1%  Avg glucose 144  GMI 6.7%    Most recent A1c results summarized below  Lab Results   Component Value Date/Time    LABA1C 8.0 10/28/2024 03:05 PM    LABA1C 8.0 08/01/2024 03:49 PM    LABA1C 8.0 05/07/2024 02:45 PM     She has infrequent hypoglycemia  The patient has been mindful of what has been eating and following diabetic diet as encouraged  I reviewed current medications and the patient has no issues with diabetes medications  Ani Torres is up to date with eye exam  The patient performs her own feet care  Microvascular complications:  No Retinopathy, Nephropathy or Neuropathy   Macrovascular complications: no CAD, PVD, or Stroke  The patient receives Flushot every year    On Levothyroxine 25 mcg daily.   Patient takes levothyroxine in the morning at empty stomach, wait one hour before eating , avoid multivitamins containing calcium  or iron with it.    Lab Results   Component Value Date/Time    TSH 2.04 08/31/2024 11:11 AM    T4FREE 1.3 08/31/2024 11:11 AM         PAST MEDICAL HISTORY   Past Medical History:   Diagnosis Date    Diabetes

## 2024-11-30 DIAGNOSIS — I10 ESSENTIAL HYPERTENSION: ICD-10-CM

## 2024-12-02 RX ORDER — LISINOPRIL 20 MG/1
20 TABLET ORAL DAILY
Qty: 90 TABLET | Refills: 1 | Status: SHIPPED | OUTPATIENT
Start: 2024-12-02

## 2024-12-02 RX ORDER — AMLODIPINE BESYLATE 5 MG/1
5 TABLET ORAL DAILY
Qty: 90 TABLET | Refills: 1 | Status: SHIPPED | OUTPATIENT
Start: 2024-12-02

## 2024-12-02 NOTE — TELEPHONE ENCOUNTER
Name of Medication(s) Requested:  Requested Prescriptions     Pending Prescriptions Disp Refills    amLODIPine (NORVASC) 5 MG tablet [Pharmacy Med Name: amLODIPine Besylate Oral Tablet 5 MG] 90 tablet 1     Sig: TAKE ONE TABLET BY MOUTH DAILY    lisinopril (PRINIVIL;ZESTRIL) 20 MG tablet [Pharmacy Med Name: Lisinopril Oral Tablet 20 MG] 90 tablet 1     Sig: TAKE ONE TABLET BY MOUTH DAILY       Medication is on current medication list Yes    Dosage and directions were verified? Yes    Quantity verified: 90day supply     Pharmacy Verified?  Yes    Last Appointment:  Visit date not found    Future appts:  Future Appointments   Date Time Provider Department Center   1/23/2025  3:00 PM Lilia Madrid APRN - CNP BD ENDO [facility]   4/23/2025  3:00 PM Amria Cortés APRN - NP [facility] ENDO [facility]        (If no appt send self scheduling link. .REFILLAPPT)  Scheduling request sent?     [] Yes  [] No    Does patient need updated?  [] Yes  [] No

## 2024-12-23 DIAGNOSIS — E55.9 VITAMIN D DEFICIENCY: ICD-10-CM

## 2024-12-24 NOTE — TELEPHONE ENCOUNTER
Name of Medication(s) Requested:  Requested Prescriptions     Pending Prescriptions Disp Refills    vitamin D (ERGOCALCIFEROL) 1.25 MG (41484 UT) CAPS capsule [Pharmacy Med Name: Vitamin D (Ergocalciferol) Oral Capsule 1.25 MG (51986 UT)] 12 capsule 3     Sig: TAKE ONE CAPSULE BY MOUTH ONCE A WEEK at 5 pm       Medication is on current medication list Yes    Dosage and directions were verified? Yes    Quantity verified: 30 day supply     Pharmacy Verified?  Yes    Last Appointment:  8/26/2024    Future appts:  Future Appointments   Date Time Provider Department Center   1/23/2025  3:00 PM Lilia Madrid APRN - CNP BD ENDO [facility]   4/23/2025  3:00 PM Amira Cortés APRN - NP [facility] ENDO [facility]        (If no appt send self scheduling link. .REFILLAPPT)  Scheduling request sent?     [] Yes  [] No    Does patient need updated?  [] Yes  [] No

## 2024-12-26 RX ORDER — ERGOCALCIFEROL 1.25 MG/1
CAPSULE, LIQUID FILLED ORAL
Qty: 12 CAPSULE | Refills: 3 | Status: SHIPPED | OUTPATIENT
Start: 2024-12-26

## 2024-12-28 DIAGNOSIS — E03.9 ACQUIRED HYPOTHYROIDISM: ICD-10-CM

## 2024-12-30 RX ORDER — LEVOTHYROXINE SODIUM 25 UG/1
TABLET ORAL
Qty: 90 TABLET | Refills: 3 | Status: SHIPPED | OUTPATIENT
Start: 2024-12-30

## 2024-12-30 NOTE — TELEPHONE ENCOUNTER
Name of Medication(s) Requested:  Requested Prescriptions     Pending Prescriptions Disp Refills    levothyroxine (SYNTHROID) 25 MCG tablet [Pharmacy Med Name: Levothyroxine Sodium Oral Tablet 25 MCG] 90 tablet 3     Sig: TAKE ONE TABLET BY MOUTH DAILY       Medication is on current medication list Yes    Dosage and directions were verified? Yes    Quantity verified: 90 day supply     Pharmacy Verified?  Yes    Last Appointment:  8/26/2024    Future appts:  Future Appointments   Date Time Provider Department Center   1/23/2025  3:00 PM Lilia Madrid APRN - CNP BD ENDO [facility]   4/23/2025  3:00 PM Amira Cortés APRN - NP BD ENDO [facility]        (If no appt send self scheduling link. .REFILLAPPT)  Scheduling request sent?     [] Yes  [] No    Does patient need updated?  [] Yes  [] No

## 2025-01-23 ENCOUNTER — OFFICE VISIT (OUTPATIENT)
Dept: ENDOCRINOLOGY | Age: 65
End: 2025-01-23

## 2025-01-23 VITALS
SYSTOLIC BLOOD PRESSURE: 161 MMHG | HEIGHT: 68 IN | DIASTOLIC BLOOD PRESSURE: 84 MMHG | HEART RATE: 81 BPM | RESPIRATION RATE: 16 BRPM | BODY MASS INDEX: 22.13 KG/M2 | OXYGEN SATURATION: 98 % | WEIGHT: 146 LBS

## 2025-01-23 DIAGNOSIS — E78.2 MIXED HYPERLIPIDEMIA: ICD-10-CM

## 2025-01-23 DIAGNOSIS — E03.9 HYPOTHYROIDISM, UNSPECIFIED TYPE: ICD-10-CM

## 2025-01-23 DIAGNOSIS — E10.65 TYPE 1 DIABETES MELLITUS WITH HYPERGLYCEMIA (HCC): Primary | ICD-10-CM

## 2025-01-23 DIAGNOSIS — E55.9 VITAMIN D DEFICIENCY: ICD-10-CM

## 2025-01-23 LAB — HBA1C MFR BLD: 8 %

## 2025-01-23 NOTE — PROGRESS NOTES
[facility] [employer]  [facility] Department of Endocrinology Diabetes and Metabolism   [address], [place] [address]  Phone: [phone]  Fax: [phone]    Date of Service: 1/23/2025  Primary Care Physician: Zoie Baker PA-C  Provider: MYRTLE Guerrero CNP    Reason for the visit:    DM type 1     History of Present Illness:  The history is provided by the patient. No  was used. Accuracy of the patient data is excellent.  Ani Torres is a very pleasant 64 y.o. female seen today for diabetes management     Ani Torres was diagnosed with diabetes at age 34  and currently on Medtronic 780g insulin pump with Guardian sensor (upgraded in Jan)    Current  settings:    Basal rate 12a 0.70, CR 9.5, ISF 52, target 100-150, active insulin time 2:00 hrs, smart guard target 100      TIR 76%  Hyperglycemia 24%  Hypoglycemia 0%  Avg glucose 150  GMI 6.9%      Patient's hemoglobin A1c is consistently much higher than her pump and CGM readings.  Patient does calibrate her pump at home with her glucometer.  Suspect that the hemoglobin A1c is falsely elevated, perhaps from chronic salicylate use.    Most recent A1c results summarized below  Lab Results   Component Value Date/Time    LABA1C 8.0 01/23/2025 03:12 PM    LABA1C 8.0 10/28/2024 03:05 PM    LABA1C 8.0 08/01/2024 03:49 PM     She has infrequent hypoglycemia  The patient has been mindful of what has been eating and following diabetic diet as encouraged  I reviewed current medications and the patient has no issues with diabetes medications  Ani Torres is up to date with eye exam  The patient performs her own feet care  Microvascular complications:  No Retinopathy, Nephropathy or Neuropathy   Macrovascular complications: no CAD, PVD, or Stroke  The patient receives Flushot every year    On Levothyroxine 25 mcg daily.   Patient takes levothyroxine in the morning at empty stomach, wait one hour before eating , avoid

## 2025-04-23 ENCOUNTER — OFFICE VISIT (OUTPATIENT)
Dept: ENDOCRINOLOGY | Age: 65
End: 2025-04-23
Payer: COMMERCIAL

## 2025-04-23 VITALS
HEIGHT: 68 IN | RESPIRATION RATE: 18 BRPM | TEMPERATURE: 97.7 F | SYSTOLIC BLOOD PRESSURE: 125 MMHG | BODY MASS INDEX: 22.43 KG/M2 | HEART RATE: 98 BPM | OXYGEN SATURATION: 99 % | DIASTOLIC BLOOD PRESSURE: 54 MMHG | WEIGHT: 148 LBS

## 2025-04-23 DIAGNOSIS — E55.9 VITAMIN D DEFICIENCY: ICD-10-CM

## 2025-04-23 DIAGNOSIS — E78.2 MIXED HYPERLIPIDEMIA: ICD-10-CM

## 2025-04-23 DIAGNOSIS — E10.65 TYPE 1 DIABETES MELLITUS WITH HYPERGLYCEMIA (HCC): Primary | ICD-10-CM

## 2025-04-23 DIAGNOSIS — E03.9 HYPOTHYROIDISM, UNSPECIFIED TYPE: ICD-10-CM

## 2025-04-23 LAB — HBA1C MFR BLD: 7.3 %

## 2025-04-23 PROCEDURE — 3051F HG A1C>EQUAL 7.0%<8.0%: CPT | Performed by: NURSE PRACTITIONER

## 2025-04-23 PROCEDURE — 3074F SYST BP LT 130 MM HG: CPT | Performed by: NURSE PRACTITIONER

## 2025-04-23 PROCEDURE — 2022F DILAT RTA XM EVC RTNOPTHY: CPT | Performed by: NURSE PRACTITIONER

## 2025-04-23 PROCEDURE — 3017F COLORECTAL CA SCREEN DOC REV: CPT | Performed by: NURSE PRACTITIONER

## 2025-04-23 PROCEDURE — G8427 DOCREV CUR MEDS BY ELIG CLIN: HCPCS | Performed by: NURSE PRACTITIONER

## 2025-04-23 PROCEDURE — 83036 HEMOGLOBIN GLYCOSYLATED A1C: CPT | Performed by: NURSE PRACTITIONER

## 2025-04-23 PROCEDURE — G8420 CALC BMI NORM PARAMETERS: HCPCS | Performed by: NURSE PRACTITIONER

## 2025-04-23 PROCEDURE — 3078F DIAST BP <80 MM HG: CPT | Performed by: NURSE PRACTITIONER

## 2025-04-23 PROCEDURE — 99214 OFFICE O/P EST MOD 30 MIN: CPT | Performed by: NURSE PRACTITIONER

## 2025-04-23 PROCEDURE — G8400 PT W/DXA NO RESULTS DOC: HCPCS | Performed by: NURSE PRACTITIONER

## 2025-04-23 PROCEDURE — 1090F PRES/ABSN URINE INCON ASSESS: CPT | Performed by: NURSE PRACTITIONER

## 2025-04-23 PROCEDURE — 4004F PT TOBACCO SCREEN RCVD TLK: CPT | Performed by: NURSE PRACTITIONER

## 2025-04-23 PROCEDURE — 1123F ACP DISCUSS/DSCN MKR DOCD: CPT | Performed by: NURSE PRACTITIONER

## 2025-04-23 NOTE — PROGRESS NOTES
[facility] Department of Endocrinology Diabetes and Metabolism   [address], [address], [place] [address]  Phone: [phone]  Fax: [phone]    Date of Service: 4/23/2025  Primary Care Physician: Zoie Baker PA-C  Provider: MYRTLE Plasencia NP    Reason for the visit:    DM type 1     History of Present Illness:  The history is provided by the patient. No  was used. Accuracy of the patient data is excellent.  Ani Torres is a very pleasant 65 y.o. female seen today for diabetes management     Ani Torres was diagnosed with diabetes at age 34  and currently on Medtronic 780g insulin pump with Guardian sensor     Current  settings:    Basal rate 12a 0.70, CR  12am 9.5,6pm 8 ISF 52, target 100-150, active insulin time 2:00 hrs, smart guard target 100      TIR 79%  Hyperglycemia 18%  Hypoglycemia 0%  Avg glucose 138  GMI 6.6%  TDD  38.1 units        Suspect that the hemoglobin A1c is falsely elevated, perhaps from chronic salicylate use.    Most recent A1c results summarized below  Lab Results   Component Value Date/Time    LABA1C 7.3 04/23/2025 03:01 PM    LABA1C 8.0 01/23/2025 03:12 PM    LABA1C 8.0 10/28/2024 03:05 PM     She has infrequent hypoglycemia  The patient has been mindful of what has been eating and following diabetic diet as encouraged  I reviewed current medications and the patient has no issues with diabetes medications  Ani Torres is up to date with eye exam  The patient performs her own feet care  Microvascular complications:  No Retinopathy, Nephropathy or Neuropathy   Macrovascular complications: no CAD, PVD, or Stroke  The patient receives Flushot every year    On Levothyroxine 25 mcg daily.   Patient takes levothyroxine in the morning at empty stomach, wait one hour before eating , avoid multivitamins containing calcium  or iron with it.    Lab Results   Component Value Date/Time    TSH 2.04 08/31/2024 11:11 AM

## 2025-05-26 DIAGNOSIS — I10 ESSENTIAL HYPERTENSION: ICD-10-CM

## 2025-05-27 RX ORDER — AMLODIPINE BESYLATE 5 MG/1
5 TABLET ORAL DAILY
Qty: 90 TABLET | Refills: 0 | Status: SHIPPED | OUTPATIENT
Start: 2025-05-27

## 2025-05-27 NOTE — TELEPHONE ENCOUNTER
Phoned pt needs appt scheduled for medication refills Name of Medication(s) Requested:  Requested Prescriptions     Pending Prescriptions Disp Refills    amLODIPine (NORVASC) 5 MG tablet [Pharmacy Med Name: amLODIPine Besylate Oral Tablet 5 MG] 90 tablet 0     Sig: TAKE ONE TABLET BY MOUTH DAILY       Medication is on current medication list Yes    Dosage and directions were verified? Yes    Quantity verified: 90 day supply     Pharmacy Verified?  Yes    Last Appointment:  8/26/2024    Future appts:  Future Appointments   Date Time Provider Department Center   7/21/2025  2:20 PM Josh Alexis APRN - CNS [facility] ENDO [facility]   10/14/2025  3:00 PM Lilia Madrid APRN - CNP BDM ENDO [facility]        (If no appt send self scheduling link. .REFILLAPPT)  Scheduling request sent?     [x] Yes  [] No    Does patient need updated?  [] Yes  [] No

## 2025-05-31 DIAGNOSIS — I10 ESSENTIAL HYPERTENSION: Primary | ICD-10-CM

## 2025-06-02 RX ORDER — LISINOPRIL 20 MG/1
20 TABLET ORAL DAILY
Qty: 90 TABLET | Refills: 0 | Status: SHIPPED | OUTPATIENT
Start: 2025-06-02

## 2025-06-23 DIAGNOSIS — E10.65 TYPE 1 DIABETES MELLITUS WITH HYPERGLYCEMIA (HCC): ICD-10-CM

## 2025-06-24 RX ORDER — INSULIN LISPRO 100 [IU]/ML
INJECTION, SOLUTION INTRAVENOUS; SUBCUTANEOUS
Qty: 90 ML | Refills: 1 | Status: SHIPPED | OUTPATIENT
Start: 2025-06-24

## 2025-07-21 ENCOUNTER — OFFICE VISIT (OUTPATIENT)
Dept: ENDOCRINOLOGY | Age: 65
End: 2025-07-21
Payer: COMMERCIAL

## 2025-07-21 VITALS
OXYGEN SATURATION: 98 % | DIASTOLIC BLOOD PRESSURE: 70 MMHG | HEART RATE: 72 BPM | WEIGHT: 147.6 LBS | BODY MASS INDEX: 22.44 KG/M2 | SYSTOLIC BLOOD PRESSURE: 133 MMHG | TEMPERATURE: 98.3 F

## 2025-07-21 DIAGNOSIS — E03.9 ACQUIRED HYPOTHYROIDISM: ICD-10-CM

## 2025-07-21 DIAGNOSIS — E55.9 VITAMIN D DEFICIENCY: ICD-10-CM

## 2025-07-21 DIAGNOSIS — E10.65 TYPE 1 DIABETES MELLITUS WITH HYPERGLYCEMIA (HCC): Primary | ICD-10-CM

## 2025-07-21 DIAGNOSIS — E03.9 HYPOTHYROIDISM, UNSPECIFIED TYPE: ICD-10-CM

## 2025-07-21 DIAGNOSIS — E78.2 MIXED HYPERLIPIDEMIA: ICD-10-CM

## 2025-07-21 LAB — HBA1C MFR BLD: 7.2 %

## 2025-07-21 PROCEDURE — 3051F HG A1C>EQUAL 7.0%<8.0%: CPT | Performed by: CLINICAL NURSE SPECIALIST

## 2025-07-21 PROCEDURE — 3017F COLORECTAL CA SCREEN DOC REV: CPT | Performed by: CLINICAL NURSE SPECIALIST

## 2025-07-21 PROCEDURE — 2022F DILAT RTA XM EVC RTNOPTHY: CPT | Performed by: CLINICAL NURSE SPECIALIST

## 2025-07-21 PROCEDURE — G8427 DOCREV CUR MEDS BY ELIG CLIN: HCPCS | Performed by: CLINICAL NURSE SPECIALIST

## 2025-07-21 PROCEDURE — 83036 HEMOGLOBIN GLYCOSYLATED A1C: CPT | Performed by: CLINICAL NURSE SPECIALIST

## 2025-07-21 PROCEDURE — 4004F PT TOBACCO SCREEN RCVD TLK: CPT | Performed by: CLINICAL NURSE SPECIALIST

## 2025-07-21 PROCEDURE — G8420 CALC BMI NORM PARAMETERS: HCPCS | Performed by: CLINICAL NURSE SPECIALIST

## 2025-07-21 PROCEDURE — 1123F ACP DISCUSS/DSCN MKR DOCD: CPT | Performed by: CLINICAL NURSE SPECIALIST

## 2025-07-21 PROCEDURE — G8400 PT W/DXA NO RESULTS DOC: HCPCS | Performed by: CLINICAL NURSE SPECIALIST

## 2025-07-21 PROCEDURE — 99214 OFFICE O/P EST MOD 30 MIN: CPT | Performed by: CLINICAL NURSE SPECIALIST

## 2025-07-21 PROCEDURE — 1090F PRES/ABSN URINE INCON ASSESS: CPT | Performed by: CLINICAL NURSE SPECIALIST

## 2025-07-21 PROCEDURE — 3075F SYST BP GE 130 - 139MM HG: CPT | Performed by: CLINICAL NURSE SPECIALIST

## 2025-07-21 PROCEDURE — 95251 CONT GLUC MNTR ANALYSIS I&R: CPT | Performed by: CLINICAL NURSE SPECIALIST

## 2025-07-21 PROCEDURE — 3078F DIAST BP <80 MM HG: CPT | Performed by: CLINICAL NURSE SPECIALIST

## 2025-07-21 NOTE — PROGRESS NOTES
Results   Component Value Date/Time    TRIG 42 08/31/2024 11:11 AM    HDL 96 08/31/2024 11:11 AM    CHOL 176 08/31/2024 11:11 AM     Lab Results   Component Value Date/Time    VITD25 63.1 08/31/2024 11:11 AM    VITD25 56 05/31/2023 09:24 AM       ASSESSMENT & RECOMMENDATIONS   Ani Torres, a 65 y.o.-old female seen in for the following issues     Diabetes Mellitus Type 1    Diabetes well-controlled.    Continue current pump settings: Basal rate Basal rate 12a 0.756, CR  12am 9.5,6pm 8, ISF 48, target 100-150, active insulin time 2:30 hrs, smart guard target 100  Discussed with patient A1c and blood sugar goals   Patient will need routine diabetes maintenance and prevention  Check labs    Continuous Glucose Monitoring (CGM) download and interpretation   I personally reviewed and interpreted continuous glucose monitor (CGM) download. CGM report was discussed with patient including blood glucose patterns, percentages of blood glucose at goal, above goal and below goal. Insulin dosages/antidiabetic regimen was adjusted according to CGM download. Full CGM was scanned under media.     Hypothyroidism   Continue levothyroxine 25 mcg daily   Patient takes levothyroxine in the morning at empty stomach, wait one hour before eating , avoid multivitamins containing calcium  or iron with it   Check TFTs    Vitamin D deficiency   On replacement  Check level      HLD  On statin therapy  Check lipid      I personally reviewed external notes from PCP and other patient's care team providers, and personally interpreted labs associated with the above diagnosis. I also ordered labs to further assess and manage the above addressed medical conditions.     Return in about 3 months (around 10/21/2025).    The above issues were reviewed with the patient who understood and agreed with the plan.    Thank you for allowing us to participate in the care of this patient. Please do not hesitate to contact us with any additional questions.

## 2025-07-26 ENCOUNTER — HOSPITAL ENCOUNTER (OUTPATIENT)
Age: 65
Discharge: HOME | End: 2025-07-26
Payer: COMMERCIAL

## 2025-07-26 DIAGNOSIS — E03.9 HYPOTHYROIDISM, UNSPECIFIED TYPE: ICD-10-CM

## 2025-07-26 DIAGNOSIS — E10.65 TYPE 1 DIABETES MELLITUS WITH HYPERGLYCEMIA (HCC): ICD-10-CM

## 2025-07-26 LAB
ALBUMIN SERPL-MCNC: 4.2 G/DL (ref 3.5–5.2)
ALP SERPL-CCNC: 87 U/L (ref 35–104)
ALT SERPL-CCNC: 18 U/L (ref 0–35)
ANION GAP SERPL CALCULATED.3IONS-SCNC: 10 MMOL/L (ref 7–16)
AST SERPL-CCNC: 21 U/L (ref 0–35)
BILIRUB SERPL-MCNC: 0.8 MG/DL (ref 0–1.2)
BUN SERPL-MCNC: 16 MG/DL (ref 8–23)
CALCIUM SERPL-MCNC: 9.7 MG/DL (ref 8.8–10.2)
CHLORIDE SERPL-SCNC: 104 MMOL/L (ref 98–107)
CHOLEST SERPL-MCNC: 183 MG/DL
CO2 SERPL-SCNC: 26 MMOL/L (ref 22–29)
CREAT SERPL-MCNC: 0.7 MG/DL (ref 0.5–1)
CREAT UR-MCNC: 167 MG/DL (ref 29–226)
GFR, ESTIMATED: >90 ML/MIN/1.73M2
GLUCOSE SERPL-MCNC: 120 MG/DL (ref 74–99)
HDLC SERPL-MCNC: 89 MG/DL
LDLC SERPL CALC-MCNC: 84 MG/DL
MICROALBUMIN UR-MCNC: 21 MG/L (ref 0–20)
MICROALBUMIN/CREAT UR-RTO: 13 MCG/MG CREAT (ref 0–30)
POTASSIUM SERPL-SCNC: 4.6 MMOL/L (ref 3.5–5.1)
PROT SERPL-MCNC: 6.7 G/DL (ref 6.4–8.3)
SODIUM SERPL-SCNC: 140 MMOL/L (ref 136–145)
T4 FREE SERPL-MCNC: 1.3 NG/DL (ref 0.9–1.7)
TRIGL SERPL-MCNC: 51 MG/DL
TSH SERPL DL<=0.05 MIU/L-ACNC: 2.3 UIU/ML (ref 0.27–4.2)
VLDLC SERPL CALC-MCNC: 10 MG/DL

## 2025-07-26 PROCEDURE — 36415 COLL VENOUS BLD VENIPUNCTURE: CPT

## 2025-07-26 PROCEDURE — 82570 ASSAY OF URINE CREATININE: CPT

## 2025-07-26 PROCEDURE — 80061 LIPID PANEL: CPT

## 2025-07-26 PROCEDURE — 84439 ASSAY OF FREE THYROXINE: CPT

## 2025-07-26 PROCEDURE — 84443 ASSAY THYROID STIM HORMONE: CPT

## 2025-07-26 PROCEDURE — 82043 UR ALBUMIN QUANTITATIVE: CPT

## 2025-07-26 PROCEDURE — 80053 COMPREHEN METABOLIC PANEL: CPT

## 2025-08-05 DIAGNOSIS — E78.2 MIXED HYPERLIPIDEMIA: ICD-10-CM

## 2025-08-05 RX ORDER — ATORVASTATIN CALCIUM 10 MG/1
TABLET, FILM COATED ORAL
Qty: 12 TABLET | Refills: 1 | Status: SHIPPED | OUTPATIENT
Start: 2025-08-05